# Patient Record
Sex: FEMALE | Race: BLACK OR AFRICAN AMERICAN | NOT HISPANIC OR LATINO | Employment: OTHER | URBAN - METROPOLITAN AREA
[De-identification: names, ages, dates, MRNs, and addresses within clinical notes are randomized per-mention and may not be internally consistent; named-entity substitution may affect disease eponyms.]

---

## 2021-10-05 ENCOUNTER — TELEPHONE (OUTPATIENT)
Dept: OBSTETRICS AND GYNECOLOGY | Facility: CLINIC | Age: 36
End: 2021-10-05

## 2021-10-14 ENCOUNTER — OFFICE VISIT (OUTPATIENT)
Dept: OBSTETRICS AND GYNECOLOGY | Facility: CLINIC | Age: 36
End: 2021-10-14
Payer: MEDICAID

## 2021-10-14 VITALS
WEIGHT: 181 LBS | HEIGHT: 60 IN | DIASTOLIC BLOOD PRESSURE: 72 MMHG | BODY MASS INDEX: 35.53 KG/M2 | SYSTOLIC BLOOD PRESSURE: 108 MMHG

## 2021-10-14 DIAGNOSIS — N63.21 BREAST LUMP ON LEFT SIDE AT 2 O'CLOCK POSITION: ICD-10-CM

## 2021-10-14 DIAGNOSIS — Z00.00 WELLNESS EXAMINATION: Primary | ICD-10-CM

## 2021-10-14 PROCEDURE — 88175 CYTOPATH C/V AUTO FLUID REDO: CPT | Performed by: NURSE PRACTITIONER

## 2021-10-14 PROCEDURE — 99395 PR PREVENTIVE VISIT,EST,18-39: ICD-10-PCS | Mod: S$GLB,,, | Performed by: NURSE PRACTITIONER

## 2021-10-14 PROCEDURE — 99395 PREV VISIT EST AGE 18-39: CPT | Mod: S$GLB,,, | Performed by: NURSE PRACTITIONER

## 2021-10-14 RX ORDER — OXYCODONE AND ACETAMINOPHEN 7.5; 325 MG/1; MG/1
1 TABLET ORAL EVERY 8 HOURS PRN
COMMUNITY
Start: 2021-09-30

## 2021-10-20 LAB
FINAL PATHOLOGIC DIAGNOSIS: NORMAL
Lab: NORMAL

## 2021-11-05 ENCOUNTER — HOSPITAL ENCOUNTER (OUTPATIENT)
Dept: RADIOLOGY | Facility: HOSPITAL | Age: 36
Discharge: HOME | End: 2021-11-05
Attending: NURSE PRACTITIONER
Payer: MEDICAID

## 2021-11-05 VITALS — HEIGHT: 60 IN | WEIGHT: 181 LBS | BODY MASS INDEX: 35.53 KG/M2

## 2021-11-05 DIAGNOSIS — N63.21 BREAST LUMP ON LEFT SIDE AT 2 O'CLOCK POSITION: ICD-10-CM

## 2021-11-05 PROCEDURE — 77066 DX MAMMO INCL CAD BI: CPT | Mod: 26,,, | Performed by: RADIOLOGY

## 2021-11-05 PROCEDURE — 77066 MAMMO DIGITAL DIAGNOSTIC BILAT WITH TOMO: ICD-10-PCS | Mod: 26,,, | Performed by: RADIOLOGY

## 2021-11-05 PROCEDURE — 76642 ULTRASOUND BREAST LIMITED: CPT | Mod: TC,LT

## 2021-11-05 PROCEDURE — 77062 BREAST TOMOSYNTHESIS BI: CPT | Mod: 26,,, | Performed by: RADIOLOGY

## 2021-11-05 PROCEDURE — 77062 MAMMO DIGITAL DIAGNOSTIC BILAT WITH TOMO: ICD-10-PCS | Mod: 26,,, | Performed by: RADIOLOGY

## 2021-11-05 PROCEDURE — 77062 BREAST TOMOSYNTHESIS BI: CPT | Mod: TC

## 2021-11-05 PROCEDURE — 76642 ULTRASOUND BREAST LIMITED: CPT | Mod: 26,LT,, | Performed by: RADIOLOGY

## 2021-11-05 PROCEDURE — 76642 US BREAST LEFT LIMITED: ICD-10-PCS | Mod: 26,LT,, | Performed by: RADIOLOGY

## 2021-11-11 ENCOUNTER — OFFICE VISIT (OUTPATIENT)
Dept: OBSTETRICS AND GYNECOLOGY | Facility: CLINIC | Age: 36
End: 2021-11-11
Payer: MEDICAID

## 2021-11-11 ENCOUNTER — TELEPHONE (OUTPATIENT)
Dept: OBSTETRICS AND GYNECOLOGY | Facility: CLINIC | Age: 36
End: 2021-11-11

## 2021-11-11 VITALS
BODY MASS INDEX: 35.14 KG/M2 | WEIGHT: 179 LBS | SYSTOLIC BLOOD PRESSURE: 128 MMHG | DIASTOLIC BLOOD PRESSURE: 70 MMHG | HEIGHT: 60 IN

## 2021-11-11 DIAGNOSIS — Z71.3 WEIGHT LOSS COUNSELING, ENCOUNTER FOR: Primary | ICD-10-CM

## 2021-11-11 PROCEDURE — 99213 PR OFFICE/OUTPT VISIT, EST, LEVL III, 20-29 MIN: ICD-10-PCS | Mod: S$GLB,,, | Performed by: NURSE PRACTITIONER

## 2021-11-11 PROCEDURE — 99213 OFFICE O/P EST LOW 20 MIN: CPT | Mod: S$GLB,,, | Performed by: NURSE PRACTITIONER

## 2021-12-02 ENCOUNTER — HOSPITAL ENCOUNTER (OUTPATIENT)
Dept: RADIOLOGY | Facility: HOSPITAL | Age: 36
Discharge: HOME | End: 2021-12-02
Attending: NURSE PRACTITIONER
Payer: MEDICAID

## 2021-12-02 DIAGNOSIS — Z98.890 STATUS POST BREAST BIOPSY: ICD-10-CM

## 2021-12-02 DIAGNOSIS — N63.20 BREAST MASS, LEFT: ICD-10-CM

## 2021-12-02 DIAGNOSIS — R92.8 ABNORMAL ULTRASOUND OF BREAST: ICD-10-CM

## 2021-12-02 PROCEDURE — 88305 TISSUE EXAM BY PATHOLOGIST: CPT | Mod: 26,,, | Performed by: PATHOLOGY

## 2021-12-02 PROCEDURE — A4550 SURGICAL TRAYS: HCPCS

## 2021-12-02 PROCEDURE — 19083 US BREAST BIOPSY WITH IMAGING 1ST SITE LEFT: ICD-10-PCS | Mod: LT,,, | Performed by: RADIOLOGY

## 2021-12-02 PROCEDURE — 88305 TISSUE EXAM BY PATHOLOGIST: CPT | Performed by: PATHOLOGY

## 2021-12-02 PROCEDURE — 25000003 PHARM REV CODE 250: Performed by: RADIOLOGY

## 2021-12-02 PROCEDURE — 88305 TISSUE EXAM BY PATHOLOGIST: ICD-10-PCS | Mod: 26,,, | Performed by: PATHOLOGY

## 2021-12-02 PROCEDURE — 19083 BX BREAST 1ST LESION US IMAG: CPT | Mod: LT

## 2021-12-02 PROCEDURE — 19083 BX BREAST 1ST LESION US IMAG: CPT | Mod: LT,,, | Performed by: RADIOLOGY

## 2021-12-02 RX ORDER — LIDOCAINE HYDROCHLORIDE AND EPINEPHRINE 10; 10 MG/ML; UG/ML
10 INJECTION, SOLUTION INFILTRATION; PERINEURAL ONCE
Status: COMPLETED | OUTPATIENT
Start: 2021-12-02 | End: 2021-12-02

## 2021-12-02 RX ORDER — SODIUM BICARBONATE 42 MG/ML
1 INJECTION, SOLUTION INTRAVENOUS ONCE
Status: COMPLETED | OUTPATIENT
Start: 2021-12-02 | End: 2021-12-02

## 2021-12-02 RX ADMIN — SODIUM BICARBONATE 2 ML: 42 INJECTION, SOLUTION INTRAVENOUS at 11:12

## 2021-12-02 RX ADMIN — LIDOCAINE HYDROCHLORIDE AND EPINEPHRINE 10 ML: 10; 10 INJECTION, SOLUTION INFILTRATION; PERINEURAL at 11:12

## 2021-12-03 LAB
FINAL PATHOLOGIC DIAGNOSIS: NORMAL
GROSS: NORMAL
Lab: NORMAL

## 2021-12-27 ENCOUNTER — OFFICE VISIT (OUTPATIENT)
Dept: OBSTETRICS AND GYNECOLOGY | Facility: CLINIC | Age: 36
End: 2021-12-27
Payer: MEDICAID

## 2021-12-27 VITALS
DIASTOLIC BLOOD PRESSURE: 80 MMHG | WEIGHT: 174.81 LBS | BODY MASS INDEX: 33 KG/M2 | HEIGHT: 61 IN | SYSTOLIC BLOOD PRESSURE: 100 MMHG

## 2021-12-27 DIAGNOSIS — Z71.3 WEIGHT LOSS COUNSELING, ENCOUNTER FOR: Primary | ICD-10-CM

## 2021-12-27 PROCEDURE — 3079F DIAST BP 80-89 MM HG: CPT | Mod: CPTII,S$GLB,, | Performed by: NURSE PRACTITIONER

## 2021-12-27 PROCEDURE — 3008F PR BODY MASS INDEX (BMI) DOCUMENTED: ICD-10-PCS | Mod: CPTII,S$GLB,, | Performed by: NURSE PRACTITIONER

## 2021-12-27 PROCEDURE — 1160F PR REVIEW ALL MEDS BY PRESCRIBER/CLIN PHARMACIST DOCUMENTED: ICD-10-PCS | Mod: CPTII,S$GLB,, | Performed by: NURSE PRACTITIONER

## 2021-12-27 PROCEDURE — 3074F PR MOST RECENT SYSTOLIC BLOOD PRESSURE < 130 MM HG: ICD-10-PCS | Mod: CPTII,S$GLB,, | Performed by: NURSE PRACTITIONER

## 2021-12-27 PROCEDURE — 3074F SYST BP LT 130 MM HG: CPT | Mod: CPTII,S$GLB,, | Performed by: NURSE PRACTITIONER

## 2021-12-27 PROCEDURE — 99213 PR OFFICE/OUTPT VISIT, EST, LEVL III, 20-29 MIN: ICD-10-PCS | Mod: S$GLB,,, | Performed by: NURSE PRACTITIONER

## 2021-12-27 PROCEDURE — 1159F PR MEDICATION LIST DOCUMENTED IN MEDICAL RECORD: ICD-10-PCS | Mod: CPTII,S$GLB,, | Performed by: NURSE PRACTITIONER

## 2021-12-27 PROCEDURE — 1159F MED LIST DOCD IN RCRD: CPT | Mod: CPTII,S$GLB,, | Performed by: NURSE PRACTITIONER

## 2021-12-27 PROCEDURE — 3079F PR MOST RECENT DIASTOLIC BLOOD PRESSURE 80-89 MM HG: ICD-10-PCS | Mod: CPTII,S$GLB,, | Performed by: NURSE PRACTITIONER

## 2021-12-27 PROCEDURE — 99213 OFFICE O/P EST LOW 20 MIN: CPT | Mod: S$GLB,,, | Performed by: NURSE PRACTITIONER

## 2021-12-27 PROCEDURE — 3008F BODY MASS INDEX DOCD: CPT | Mod: CPTII,S$GLB,, | Performed by: NURSE PRACTITIONER

## 2021-12-27 PROCEDURE — 1160F RVW MEDS BY RX/DR IN RCRD: CPT | Mod: CPTII,S$GLB,, | Performed by: NURSE PRACTITIONER

## 2021-12-27 RX ORDER — PHENTERMINE HYDROCHLORIDE 37.5 MG/1
37.5 TABLET ORAL EVERY MORNING
COMMUNITY
Start: 2021-11-11 | End: 2021-12-27 | Stop reason: SDUPTHER

## 2021-12-27 RX ORDER — CYCLOBENZAPRINE HCL 10 MG
TABLET ORAL
COMMUNITY
Start: 2021-11-22

## 2021-12-27 RX ORDER — PHENTERMINE HYDROCHLORIDE 37.5 MG/1
37.5 TABLET ORAL EVERY MORNING
Qty: 30 TABLET | Refills: 0 | Status: SHIPPED | OUTPATIENT
Start: 2021-12-27

## 2022-08-10 ENCOUNTER — TELEPHONE (OUTPATIENT)
Dept: OBSTETRICS AND GYNECOLOGY | Facility: CLINIC | Age: 37
End: 2022-08-10
Payer: MEDICAID

## 2022-08-10 DIAGNOSIS — N63.21 BREAST LUMP ON LEFT SIDE AT 2 O'CLOCK POSITION: Primary | ICD-10-CM

## 2022-08-10 NOTE — TELEPHONE ENCOUNTER
----- Message from Marbella Kelley MA sent at 8/9/2022  3:01 PM CDT -----  Regarding: MAMMO order  Patient Stated she wanted to be back with you and she requested to have a MAMMO ordered she supposed to have these every 6 months

## 2022-08-29 ENCOUNTER — HOSPITAL ENCOUNTER (OUTPATIENT)
Dept: RADIOLOGY | Facility: HOSPITAL | Age: 37
Discharge: HOME | End: 2022-08-29
Attending: NURSE PRACTITIONER
Payer: MEDICAID

## 2022-08-29 DIAGNOSIS — N63.21 BREAST LUMP ON LEFT SIDE AT 2 O'CLOCK POSITION: ICD-10-CM

## 2022-08-29 DIAGNOSIS — Z09 FOLLOW-UP EXAM, 3-6 MONTHS SINCE PREVIOUS EXAM: ICD-10-CM

## 2022-08-29 DIAGNOSIS — R59.0 ENLARGED LYMPH NODES IN ARMPIT: ICD-10-CM

## 2022-08-29 DIAGNOSIS — Z98.890 STATUS POST BREAST BIOPSY: ICD-10-CM

## 2022-08-29 PROCEDURE — 77065 MAMMO DIGITAL DIAGNOSTIC LEFT WITH TOMO: ICD-10-PCS | Mod: 26,LT,, | Performed by: RADIOLOGY

## 2022-08-29 PROCEDURE — 77061 MAMMO DIGITAL DIAGNOSTIC LEFT WITH TOMO: ICD-10-PCS | Mod: 26,LT,, | Performed by: RADIOLOGY

## 2022-08-29 PROCEDURE — 77065 DX MAMMO INCL CAD UNI: CPT | Mod: TC,LT

## 2022-08-29 PROCEDURE — 77061 BREAST TOMOSYNTHESIS UNI: CPT | Mod: 26,LT,, | Performed by: RADIOLOGY

## 2022-08-29 PROCEDURE — 77065 DX MAMMO INCL CAD UNI: CPT | Mod: 26,LT,, | Performed by: RADIOLOGY

## 2024-10-04 ENCOUNTER — PATIENT MESSAGE (OUTPATIENT)
Dept: OBSTETRICS AND GYNECOLOGY | Facility: CLINIC | Age: 39
End: 2024-10-04
Payer: MEDICAID

## 2024-10-07 ENCOUNTER — OFFICE VISIT (OUTPATIENT)
Dept: OBSTETRICS AND GYNECOLOGY | Facility: CLINIC | Age: 39
End: 2024-10-07
Payer: MEDICAID

## 2024-10-07 VITALS
DIASTOLIC BLOOD PRESSURE: 70 MMHG | WEIGHT: 172.19 LBS | SYSTOLIC BLOOD PRESSURE: 118 MMHG | RESPIRATION RATE: 18 BRPM | HEART RATE: 65 BPM | BODY MASS INDEX: 32.53 KG/M2

## 2024-10-07 DIAGNOSIS — Z32.00 ENCOUNTER FOR CONFIRMATION OF PREGNANCY TEST RESULT WITH PHYSICAL EXAMINATION: Primary | ICD-10-CM

## 2024-10-07 LAB
B-HCG UR QL: POSITIVE
CREAT UR-MCNC: 297 MG/DL (ref 15–325)
CTP QC/QA: YES
PROT UR-MCNC: 15 MG/DL (ref 0–15)
PROT/CREAT UR: 0.05 MG/G{CREAT} (ref 0–0.2)

## 2024-10-07 PROCEDURE — 1159F MED LIST DOCD IN RCRD: CPT | Mod: CPTII,,, | Performed by: FAMILY MEDICINE

## 2024-10-07 PROCEDURE — 87086 URINE CULTURE/COLONY COUNT: CPT | Performed by: FAMILY MEDICINE

## 2024-10-07 PROCEDURE — 3074F SYST BP LT 130 MM HG: CPT | Mod: CPTII,,, | Performed by: FAMILY MEDICINE

## 2024-10-07 PROCEDURE — 99999PBSHW POCT URINE PREGNANCY: Mod: PBBFAC,,,

## 2024-10-07 PROCEDURE — 3008F BODY MASS INDEX DOCD: CPT | Mod: CPTII,,, | Performed by: FAMILY MEDICINE

## 2024-10-07 PROCEDURE — 82570 ASSAY OF URINE CREATININE: CPT | Performed by: FAMILY MEDICINE

## 2024-10-07 PROCEDURE — 99999 PR PBB SHADOW E&M-EST. PATIENT-LVL III: CPT | Mod: PBBFAC,,, | Performed by: FAMILY MEDICINE

## 2024-10-07 PROCEDURE — 99213 OFFICE O/P EST LOW 20 MIN: CPT | Mod: S$PBB,,, | Performed by: FAMILY MEDICINE

## 2024-10-07 PROCEDURE — 81025 URINE PREGNANCY TEST: CPT | Mod: PBBFAC,PO | Performed by: FAMILY MEDICINE

## 2024-10-07 PROCEDURE — 99213 OFFICE O/P EST LOW 20 MIN: CPT | Mod: PBBFAC,PO | Performed by: FAMILY MEDICINE

## 2024-10-07 PROCEDURE — 1160F RVW MEDS BY RX/DR IN RCRD: CPT | Mod: CPTII,,, | Performed by: FAMILY MEDICINE

## 2024-10-07 PROCEDURE — 3078F DIAST BP <80 MM HG: CPT | Mod: CPTII,,, | Performed by: FAMILY MEDICINE

## 2024-10-07 NOTE — PROGRESS NOTES
Faye Mustafa  39 y.o.   MRN  [id number] PATIENT   1985   FINAL EDC:   ___   by ___    Baby: ___    SO Name: Brian ALLERGIES:    NKDA      GBS: ___  Date: ___ OB PROBLEM LIST:    AMA, 40yo @ delivery    [  ] consider ASA 81mg    [  ] Targeted US    [  ] BPP weekly @ 32wks if >39    [  ] Growth US @ 32wks    [  ] deliver @ 39wks if >39 or IVF      TO-DO THROUGHOUT PREGNANCY:  Depression Screen: ___  Circumcision: ___  Rhogam: ___  Flu Shot: ___  TDAP: ___  Infant feeding: ___   Plans for epidural: ___  Classes at hospital: ___  PP contraception: ___  Delivery Consent: ___  TOLAC counseling: ___  BTL counseling: ___  Pediatrician: ___ MEDICATIONS:    PNV   TODAY'S PROGRESS NOTE    10/07/2024    OB INTAKE APPOINTMENT  Faye Mustafa is a 39 y.o. who presents today for her OB Intake Appointment.    She denies any problems so far.       PREGNANCY DATING:    Pregnancy test today = positive  LMP 24 ---gives EDC---> 25 ----> 6+5 wks EGA today    Sure LMP: Yes  Prior US done: No  Other information relevant to dating (sure conception date, IVF date, etc.): No     CARRIER SCREENING PREVIOUSLY DONE:  Cystic Fibrosis, Spinal Muscular Atrophy, Fragile X:  Per patient, a prior screen was negative.  Hemoglobinopathies: Prior testing reviewed.  The patient has no known hemoglobinopathies.     FAMILY LINEAGE AND HISTORY:  Ashkenazi or North American Pentecostalism:  No  Intellectual disability:  No  Premature ovarian failure (<40yoa):  No  Cajun or Pakistani Dyer:  No    MISCELLANEOUS:  Does the patient have cats? No    MEDICATIONS:  Current Outpatient Medications   Medication Instructions    DUPIXENT  mg/2 mL PnIj SMARTSI Milligram(s) SUB-Q Every 2 Weeks    prenatal vit,calc76/iron/folic (PNV 29-1 ORAL) 1 tablet, Oral, Daily       --------------------------------------------------------------------------------     ASSESMENT & PLAN:  Pregnancy confirmed today with a positive  "pregnancy test.  Patient's medical history was obtained today.    A first trimester dating ultrasound was ordered and scheduled (ideally done between 8 and 10wks).    Gave patient our OB Welcome Packet and reviewed its contents.  Our discussion included:  an overview of appointments, hydration / nutrition / weight gain advice, safe OTC medications, what substances and exposures to avoid, vaccine recommendations, advice for morning sickness, dental hygiene advice, recommendations regarding exercise, advice for travel in pregnancy, information about breastfeeding, postpartum birth control, and circumcision, pediatricians in the community, WIC enrollment, how to contact us for concerns or problems during pregnancy, warning or danger signs.  Also provided [facility]'s publication, "Your Pregnancy from A-Z."    Advised patient to enroll in Everypost if not already done.    Medication management:.  Advised patient to start a prenatal vitamin if not already taking.  It should contain 600mcg folic acid, 27mg iron, and 200mg DHA.     Genetic and Carrier Screening options were discussed in detail with the patient.  Once her EDC is confirmed, she may go to [facility] for the test(s) if desired @ 9wks or greater.  She was encouraged to review the detailed information available in the OB Welcome Packet.    The patient was directed to the lab for  Routine OB labs      PAP smear: up to date    SAB precautions reviewed    Timing of next clinic appointment will be determined by dating ultrasound.  Will send patient a message in Everypost.    Dalia Mariee NP     LABS   INITIAL LABS:    Use LNOB (for Epic auto-fill)  Use LLWOBLABS (for manual entry) OTHER LABS:    Use L28W (for Epic auto-fill)  Use LLWOBLABS (for manual entry)   ULTRASOUNDS   ANATOMY SCAN:    Use LLWOANATOMYSCAN      HISTORY   MEDICAL HISTORY:    Pre-pregnancy BMI = 33  anemia SURGICAL HISTORY:    Breast biopsy (Left)  Carpal Tunnel Release  Breast reduction   "     OBSTETRIC HISTORY   Month/Year Mode of Delivery EGA Wt. M/F Epidural Complications / Comments   2018  Term 6#5 Female none    2016  Term 6#5 Male none    2010  Term 6#13 Female Epidural    2005  Term 6#5 Male Epidural               SOCIAL HISTORY:    Smoker: non-smoker  Alcohol: denies  Drugs: denies  Relationship:   Domestic Violence: no  Lives with:   Education Level: Undergraduate Degree  Occupation: homemaker  Christianity: n/a GYN HISTORY:    PAP'S: no h/o abnormals  LAST PAP: PAP neg / Date: 10/20/2021  STD Hx: no past history  GENITAL HSV: no FAMILY HISTORY:    HTN: yes (mother and father)  DIABETES: yes (mother)  BLEEDING D/O: no  CLOTTING D/O: no  BIRTH DEFECTS: no  MENTAL DISABILITY: no  GYN CANCER: yes  Breast CA: M aunt, P aunt     Follow up for dating ultrasound around 9 wk EGA, 1st OB around 10-12 wk EGA.   Future Appointments   Date Time Provider Department Center   10/11/2024  4:00 PM LAB, [facility] [place]   10/23/2024  4:00 PM ULTRASOUND, [facility] [place] [facility]   10/30/2024  2:30 PM Manjula Wall MD [facility] [place] [facility]

## 2024-10-08 LAB — BACTERIA UR CULT: NO GROWTH

## 2024-10-11 ENCOUNTER — LAB VISIT (OUTPATIENT)
Dept: LAB | Facility: HOSPITAL | Age: 39
End: 2024-10-11
Attending: FAMILY MEDICINE
Payer: MEDICAID

## 2024-10-11 DIAGNOSIS — Z32.00 ENCOUNTER FOR CONFIRMATION OF PREGNANCY TEST RESULT WITH PHYSICAL EXAMINATION: ICD-10-CM

## 2024-10-11 LAB
ALBUMIN SERPL BCP-MCNC: 3.5 G/DL (ref 3.5–5.2)
ALP SERPL-CCNC: 53 U/L (ref 55–135)
ALT SERPL W/O P-5'-P-CCNC: 10 U/L (ref 10–44)
ANION GAP SERPL CALC-SCNC: 6 MMOL/L (ref 8–16)
AST SERPL-CCNC: 14 U/L (ref 10–40)
BASOPHILS # BLD AUTO: 0.04 K/UL (ref 0–0.2)
BASOPHILS NFR BLD: 0.6 % (ref 0–1.9)
BILIRUB SERPL-MCNC: 0.3 MG/DL (ref 0.1–1)
BUN SERPL-MCNC: 7 MG/DL (ref 6–20)
CALCIUM SERPL-MCNC: 9.3 MG/DL (ref 8.7–10.5)
CHLORIDE SERPL-SCNC: 105 MMOL/L (ref 95–110)
CO2 SERPL-SCNC: 24 MMOL/L (ref 23–29)
CREAT SERPL-MCNC: 0.7 MG/DL (ref 0.5–1.4)
DIFFERENTIAL METHOD BLD: ABNORMAL
EOSINOPHIL # BLD AUTO: 0 K/UL (ref 0–0.5)
EOSINOPHIL NFR BLD: 0.1 % (ref 0–8)
ERYTHROCYTE [DISTWIDTH] IN BLOOD BY AUTOMATED COUNT: 16.3 % (ref 11.5–14.5)
EST. GFR  (NO RACE VARIABLE): >60 ML/MIN/1.73 M^2
GLUCOSE SERPL-MCNC: 81 MG/DL (ref 70–110)
HCT VFR BLD AUTO: 32.6 % (ref 37–48.5)
HGB BLD-MCNC: 10.3 G/DL (ref 12–16)
IMM GRANULOCYTES # BLD AUTO: 0.01 K/UL (ref 0–0.04)
IMM GRANULOCYTES NFR BLD AUTO: 0.1 % (ref 0–0.5)
LYMPHOCYTES # BLD AUTO: 1.9 K/UL (ref 1–4.8)
LYMPHOCYTES NFR BLD: 26.6 % (ref 18–48)
MCH RBC QN AUTO: 24.2 PG (ref 27–31)
MCHC RBC AUTO-ENTMCNC: 31.6 G/DL (ref 32–36)
MCV RBC AUTO: 77 FL (ref 82–98)
MONOCYTES # BLD AUTO: 0.6 K/UL (ref 0.3–1)
MONOCYTES NFR BLD: 8.9 % (ref 4–15)
NEUTROPHILS # BLD AUTO: 4.6 K/UL (ref 1.8–7.7)
NEUTROPHILS NFR BLD: 63.7 % (ref 38–73)
NRBC BLD-RTO: 0 /100 WBC
PLATELET # BLD AUTO: 281 K/UL (ref 150–450)
PMV BLD AUTO: 11 FL (ref 9.2–12.9)
POTASSIUM SERPL-SCNC: 3.6 MMOL/L (ref 3.5–5.1)
PROT SERPL-MCNC: 7.2 G/DL (ref 6–8.4)
RBC # BLD AUTO: 4.25 M/UL (ref 4–5.4)
SODIUM SERPL-SCNC: 135 MMOL/L (ref 136–145)
TREPONEMA PALLIDUM IGG+IGM AB [PRESENCE] IN SERUM OR PLASMA BY IMMUNOASSAY: NONREACTIVE
WBC # BLD AUTO: 7.22 K/UL (ref 3.9–12.7)

## 2024-10-11 PROCEDURE — 83021 HEMOGLOBIN CHROMOTOGRAPHY: CPT | Performed by: FAMILY MEDICINE

## 2024-10-11 PROCEDURE — 80053 COMPREHEN METABOLIC PANEL: CPT | Performed by: FAMILY MEDICINE

## 2024-10-11 PROCEDURE — 86900 BLOOD TYPING SEROLOGIC ABO: CPT | Performed by: FAMILY MEDICINE

## 2024-10-11 PROCEDURE — 86593 SYPHILIS TEST NON-TREP QUANT: CPT | Performed by: FAMILY MEDICINE

## 2024-10-11 PROCEDURE — 86803 HEPATITIS C AB TEST: CPT | Performed by: FAMILY MEDICINE

## 2024-10-11 PROCEDURE — 87340 HEPATITIS B SURFACE AG IA: CPT | Performed by: FAMILY MEDICINE

## 2024-10-11 PROCEDURE — 86850 RBC ANTIBODY SCREEN: CPT | Performed by: FAMILY MEDICINE

## 2024-10-11 PROCEDURE — 87389 HIV-1 AG W/HIV-1&-2 AB AG IA: CPT | Performed by: FAMILY MEDICINE

## 2024-10-11 PROCEDURE — 86901 BLOOD TYPING SEROLOGIC RH(D): CPT | Performed by: FAMILY MEDICINE

## 2024-10-11 PROCEDURE — 85025 COMPLETE CBC W/AUTO DIFF WBC: CPT | Performed by: FAMILY MEDICINE

## 2024-10-11 PROCEDURE — 86787 VARICELLA-ZOSTER ANTIBODY: CPT | Performed by: FAMILY MEDICINE

## 2024-10-11 PROCEDURE — 86762 RUBELLA ANTIBODY: CPT | Performed by: FAMILY MEDICINE

## 2024-10-11 PROCEDURE — 83020 HEMOGLOBIN ELECTROPHORESIS: CPT | Performed by: FAMILY MEDICINE

## 2024-10-11 PROCEDURE — 83036 HEMOGLOBIN GLYCOSYLATED A1C: CPT | Performed by: FAMILY MEDICINE

## 2024-10-12 ENCOUNTER — PATIENT MESSAGE (OUTPATIENT)
Dept: OBSTETRICS AND GYNECOLOGY | Facility: CLINIC | Age: 39
End: 2024-10-12
Payer: MEDICAID

## 2024-10-12 DIAGNOSIS — J40 BRONCHITIS: ICD-10-CM

## 2024-10-12 DIAGNOSIS — J06.9 UPPER RESPIRATORY TRACT INFECTION, UNSPECIFIED TYPE: Primary | ICD-10-CM

## 2024-10-12 LAB
ABO + RH BLD: NORMAL
BLD GP AB SCN CELLS X3 SERPL QL: NORMAL
ESTIMATED AVG GLUCOSE: 105 MG/DL (ref 68–131)
HBA1C MFR BLD: 5.3 % (ref 4–5.6)
HBV SURFACE AG SERPL QL IA: NORMAL
HCV AB SERPL QL IA: NORMAL
HIV 1+2 AB+HIV1 P24 AG SERPL QL IA: NORMAL
VARICELLA INTERPRETATION: POSITIVE
VARICELLA ZOSTER IGG: 2120

## 2024-10-14 LAB
RUBV IGG SER-ACNC: 42 IU/ML
RUBV IGG SER-IMP: REACTIVE

## 2024-10-14 RX ORDER — AMOXICILLIN AND CLAVULANATE POTASSIUM 875; 125 MG/1; MG/1
1 TABLET, FILM COATED ORAL EVERY 12 HOURS
Qty: 14 TABLET | Refills: 0 | Status: SHIPPED | OUTPATIENT
Start: 2024-10-14 | End: 2024-10-21

## 2024-10-14 NOTE — TELEPHONE ENCOUNTER
Pt was seen 10/7 for pregnancy confirmation. Please advise for medication or if pt needs to schedule an appt.

## 2024-10-15 LAB
HGB A2 MFR BLD HPLC: 2.2 % (ref 2.2–3.2)
HGB FRACT BLD ELPH-IMP: NORMAL
HGB FRACT BLD ELPH-IMP: NORMAL

## 2024-10-22 ENCOUNTER — NURSE TRIAGE (OUTPATIENT)
Dept: ADMINISTRATIVE | Facility: CLINIC | Age: 39
End: 2024-10-22
Payer: MEDICAID

## 2024-10-23 ENCOUNTER — HOSPITAL ENCOUNTER (OUTPATIENT)
Dept: OBSTETRICS AND GYNECOLOGY | Facility: CLINIC | Age: 39
Discharge: HOME | End: 2024-10-23
Payer: MEDICAID

## 2024-10-23 ENCOUNTER — PATIENT MESSAGE (OUTPATIENT)
Dept: OBSTETRICS AND GYNECOLOGY | Facility: CLINIC | Age: 39
End: 2024-10-23
Payer: MEDICAID

## 2024-10-23 DIAGNOSIS — Z32.00 ENCOUNTER FOR CONFIRMATION OF PREGNANCY TEST RESULT WITH PHYSICAL EXAMINATION: ICD-10-CM

## 2024-10-23 PROCEDURE — 76801 OB US < 14 WKS SINGLE FETUS: CPT | Mod: 26,,, | Performed by: OBSTETRICS & GYNECOLOGY

## 2024-11-01 ENCOUNTER — NURSE TRIAGE (OUTPATIENT)
Dept: ADMINISTRATIVE | Facility: CLINIC | Age: 39
End: 2024-11-01
Payer: MEDICAID

## 2024-11-04 ENCOUNTER — TELEPHONE (OUTPATIENT)
Dept: OBSTETRICS AND GYNECOLOGY | Facility: CLINIC | Age: 39
End: 2024-11-04
Payer: MEDICAID

## 2024-11-04 NOTE — TELEPHONE ENCOUNTER
Pt states she has tried everything in the OB books except the Vitamin B and non-drowsy dramamine.  Advised pt to try these and if they don't work, please let us know. Pt voiced understanding.

## 2024-11-04 NOTE — TELEPHONE ENCOUNTER
----- Message from Filiberto sent at 11/4/2024  8:56 AM CST -----  Contact: Self  Type: Needs Medical Advice  Who Called:  Patient  Symptoms (please be specific):  nausea  How long has patient had these symptoms:  1w  Pharmacy name and phone #:      [facility][id number] - [facility] [place] - [address]  [address]  [place] [address]  Phone: [phone] Fax: [phone]    Best Call Back Number: [phone]   Additional Information:  Called to have something called in for symtom

## 2024-11-06 ENCOUNTER — APPOINTMENT (OUTPATIENT)
Dept: LAB | Facility: HOSPITAL | Age: 39
End: 2024-11-06
Attending: STUDENT IN AN ORGANIZED HEALTH CARE EDUCATION/TRAINING PROGRAM
Payer: MEDICAID

## 2024-11-06 ENCOUNTER — PATIENT MESSAGE (OUTPATIENT)
Dept: OBSTETRICS AND GYNECOLOGY | Facility: CLINIC | Age: 39
End: 2024-11-06

## 2024-11-06 ENCOUNTER — INITIAL PRENATAL (OUTPATIENT)
Dept: OBSTETRICS AND GYNECOLOGY | Facility: CLINIC | Age: 39
End: 2024-11-06
Payer: MEDICAID

## 2024-11-06 VITALS
DIASTOLIC BLOOD PRESSURE: 70 MMHG | WEIGHT: 171.31 LBS | HEART RATE: 58 BPM | BODY MASS INDEX: 32.37 KG/M2 | SYSTOLIC BLOOD PRESSURE: 108 MMHG

## 2024-11-06 DIAGNOSIS — O99.011 ANEMIA DURING PREGNANCY IN FIRST TRIMESTER: ICD-10-CM

## 2024-11-06 DIAGNOSIS — Z11.3 SCREENING EXAMINATION FOR VENEREAL DISEASE: ICD-10-CM

## 2024-11-06 DIAGNOSIS — O09.521 MULTIGRAVIDA OF ADVANCED MATERNAL AGE IN FIRST TRIMESTER: Primary | ICD-10-CM

## 2024-11-06 DIAGNOSIS — O21.9 NAUSEA AND VOMITING DURING PREGNANCY PRIOR TO 22 WEEKS GESTATION: ICD-10-CM

## 2024-11-06 DIAGNOSIS — Z12.4 SCREENING FOR MALIGNANT NEOPLASM OF THE CERVIX: ICD-10-CM

## 2024-11-06 PROCEDURE — 99999 PR PBB SHADOW E&M-EST. PATIENT-LVL III: CPT | Mod: PBBFAC,,, | Performed by: STUDENT IN AN ORGANIZED HEALTH CARE EDUCATION/TRAINING PROGRAM

## 2024-11-06 PROCEDURE — 99213 OFFICE O/P EST LOW 20 MIN: CPT | Mod: PBBFAC,TH,PO | Performed by: STUDENT IN AN ORGANIZED HEALTH CARE EDUCATION/TRAINING PROGRAM

## 2024-11-06 PROCEDURE — 99203 OFFICE O/P NEW LOW 30 MIN: CPT | Mod: S$PBB,TH,, | Performed by: STUDENT IN AN ORGANIZED HEALTH CARE EDUCATION/TRAINING PROGRAM

## 2024-11-06 RX ORDER — ONDANSETRON 4 MG/1
4 TABLET, FILM COATED ORAL EVERY 8 HOURS PRN
Qty: 30 TABLET | Refills: 2 | Status: SHIPPED | OUTPATIENT
Start: 2024-11-06

## 2024-11-06 NOTE — PROGRESS NOTES
Faye Mustafa  39 y.o.   MRN  [id number] PATIENT   1985   FINAL EDC:   5.29.25   by 8 wk US    Baby: ___    SO Name: Brian ALLERGIES:    NKDA      GBS: ___  Date: ___ OB PROBLEM LIST:    AMA, 38yo @ delivery    [x ] consider ASA 81mg    [  ] Targeted US    [  ] BPP weekly @ 32wks if >39    [  ] Growth US @ 32wks    [  ] deliver @ 39wks if >39 or IVF    Obesity, BMI 33    [x ] baseline spot PCR and HbA1C      Anemia      TO-DO THROUGHOUT PREGNANCY:  Depression Screen: ___  Circumcision: ___  Rhogam: ___  Flu Shot: ___  TDAP: ___  Infant feeding: ___   Plans for epidural: ___  Classes at hospital: ___  PP contraception: ___  Delivery Consent: ___  TOLAC counseling: ___  BTL counseling: ___  Pediatrician: ___ MEDICATIONS:    PNV  Zofran  Fe   TODAY'S PROGRESS NOTE    Subjective:      Faye Mustafa is being seen today for her first obstetrical visit.  She is at 10w6d gestation by 8 wk US.  She has no complaints today.  She reports nausea and vomiting unrelieved with dramamine or unisom/B6.  She denies any VB current, but did have a gush of blood blood.    Her obstetrical history is significant for advanced maternal age.       History reviewed. No pertinent past medical history.     Past Surgical History:   Procedure Laterality Date    BREAST BIOPSY Left     benign lymph node    CARPAL TUNNEL RELEASE      REDUCTION OF BOTH BREASTS             Review of patient's allergies indicates:  No Known Allergies    Current Outpatient Medications   Medication Instructions    ondansetron (ZOFRAN) 4 mg, Oral, Every 8 hours PRN    prenatal vit,calc76/iron/folic (PNV 29-1 ORAL) 1 tablet, Daily        OB History    Para Term  AB Living   5 4 4 0 0 4   SAB IAB Ectopic Multiple Live Births   0 0 0 0 4      # Outcome Date GA Lbr Travis/2nd Weight Sex Type Anes PTL Lv   5 Current            4 Term 18   2.863 kg (6 lb 5 oz) F Vag-Spont None  BROOKS   3 Term 16   2.863 kg (6  lb 5 oz) M Vag-Spont None  BROOKS   2 Term 04/28/10   3.09 kg (6 lb 13 oz) F Vag-Spont EPI  BROOKS   1 Term 03/17/05   2.863 kg (6 lb 5 oz) M Vag-Spont EPI  BROOKS       Past GYN history:  Denies any STI or abnl Pap smears    Family History   Problem Relation Name Age of Onset    Heart disease Father      Diabetes Mother      Hypertension Mother      Breast cancer Paternal Aunt      Breast cancer Maternal Aunt        Denies any congenital/birth defects, DS, MR, SCD, CF, bleeding/clotting disorders    Social History     Tobacco Use    Smoking status: Never    Smokeless tobacco: Never   Substance Use Topics    Alcohol use: Never    Drug use: Not Currently        Review of Systems      Review of Systems   Constitutional:  Negative for chills and fever.   Eyes:  Negative for visual disturbance.   Respiratory:  Negative for cough and shortness of breath.    Cardiovascular:  Negative for chest pain and palpitations.   Gastrointestinal:  Negative for abdominal pain, nausea and vomiting.   Genitourinary:  Negative for vaginal bleeding, vaginal discharge, vaginal pain and vaginal odor.   Neurological:  Negative for headaches.   Psychiatric/Behavioral:  Negative for depression and sleep disturbance. The patient is not nervous/anxious.    All other systems reviewed and are negative.  Breast: Negative for lump and mastodynia         Objective:     Vitals:    11/06/24 1017   BP: 108/70   Pulse: (!) 58   Weight: 77.7 kg (171 lb 4.8 oz)        FHT: 161 by [employer]       General Appearance:    Alert, cooperative, no distress, appears stated age   Head:    Normocephalic, without obvious abnormality, atraumatic   Eyes:    conjunctiva/corneas clear       Nose:   Nares normal, septum midline, no drainage or sinus tenderness   Throat:   Lips normal; teeth and gums normal   Neck:   Supple, symmetrical, trachea midline, no adenopathy;     thyroid:  no enlargement/tenderness/nodules;   Back:     Symmetric, no curvature, ROM normal, no CVA tenderness    Lungs:     Clear to auscultation bilaterally, respirations unlabored   Chest Wall:    No tenderness or deformity    Heart:    Regular rate and rhythm, no murmur,   Abdomen:     Soft, non-tender,  no masses, no organomegaly   Extremities:   normal, atraumatic, no cyanosis or edema       Skin:   Skin color, texture, turgor normal, no rashes or lesions   Lymph nodes:   Cervical, supraclavicular, inguinal and axillary nodes normal        Assessment:      Pregnancy at 10 and 6/7 weeks      Problem List Items Addressed This Visit       Multigravida of advanced maternal age in first trimester - Primary    Relevant Orders    [id number] Plus W/ Sca* T21, T18, T13 & Y + X     Other Visit Diagnoses       Screening examination for venereal disease        Screening for malignant neoplasm of the cervix        Nausea and vomiting during pregnancy prior to 22 weeks gestation        Relevant Medications    ondansetron (ZOFRAN) 4 MG tablet             Plan:        Initial labs reviewed.  Discussed anemia.  Encouraged iron suppplementation  Prenatal vitamins.  Zofran offered for N/V.  Patient desires.  1T precautions given  Role of ultrasound in pregnancy discussed; fetal survey: requested.    Aspirin Questionnaire reviewed.  The patient is a candidate for ASA 81mg daily for prevention of pre-eclampsia  based on AMA and Class I obesity.  Advised to start on RV continue till delivery.  Discussed risk reduction for preeclampsia is 24%, for  birth is 14%, and for IUGR is 20%.  There have been no documented harms from low-dose aspirin therapy in pregnancy (regarding maternal or  bleeding complications and childhood problems in children who were exposed to aspirin in utero).  Sleep Apnea screening completed, and the patient is considered low-risk.  Reviewed Carrier and Aneuploidy Screening with the patient.  She requests the [id number] cfDNA test only. and had a prior Carrier Screen in previous pregnancy that was normal per  patient  PAP smear: up to date; CT/NG testing:  up to date  RTC in 4 weeks    I spent a total of 30 minutes on the day of the visit. This includes face to face time and non-face to face time preparing to see the patient (eg, review of tests), obtaining and/or reviewing separately obtained history, documenting clinical information in the electronic or other health record, independently interpreting results and communicating results to the patient/family/caregiver, or care coordinator.                    LABS   INITIAL LABS:    DATE: 10/11/24  MBT: O positive  AB SCREEN: negative  TP-ABS: negative  RUBELLA: Immune  Hep B sAg: negative  Hep C Ab: negative  HIV: negative  H/H: 10.3 / 32.6  PLT: 281  VARICELLA: Immune  HGB: normal  URINE CX: negative    PAP: PAP neg / HPV neg / Date: 24    EDITH/CHLAM/TRICH: negative x 2 / Date: 24   OTHER LABS:    10.11.24  A1c: 5.3  CMP: WNL  PCR: 0.05   ULTRASOUNDS   ANATOMY SCAN:    Use LLWOANATOMYSCAN      HISTORY   MEDICAL HISTORY:    Pre-pregnancy BMI = 33  anemia SURGICAL HISTORY:    Breast biopsy (Left)  Carpal Tunnel Release  Breast reduction       OBSTETRIC HISTORY   Month/Year Mode of Delivery EGA Wt. M/F Epidural Complications / Comments   2018  Term 6#5 Female none IOL   2016  Term 6#5 Male none IOL   2010  Term 6#13 Female Epidural    2005  Term 6#5 Male Epidural               SOCIAL HISTORY:    Smoker: non-smoker  Alcohol: denies  Drugs: denies  Relationship:   Domestic Violence: no  Lives with:   Education Level: Undergraduate Degree  Occupation: homemaker  Anabaptism: n/a GYN HISTORY:    PAP'S: no h/o abnormals  LAST PAP: NILM, negative HRHPV (24)  STD Hx: no past history  GENITAL HSV: no FAMILY HISTORY:    HTN: yes (mother and father)  DIABETES: yes (mother)  BLEEDING D/O: no  CLOTTING D/O: no  BIRTH DEFECTS: no  MENTAL DISABILITY: no  GYN CANCER: yes  Breast CA: M aunt, P aunt     No follow-ups on file.   No future  appointments.

## 2024-11-21 ENCOUNTER — TELEPHONE (OUTPATIENT)
Dept: OBSTETRICS AND GYNECOLOGY | Facility: CLINIC | Age: 39
End: 2024-11-21
Payer: MEDICAID

## 2024-11-21 ENCOUNTER — PATIENT MESSAGE (OUTPATIENT)
Dept: OBSTETRICS AND GYNECOLOGY | Facility: CLINIC | Age: 39
End: 2024-11-21
Payer: MEDICAID

## 2024-11-21 RX ORDER — FLUCONAZOLE 150 MG/1
150 TABLET ORAL DAILY
Qty: 1 TABLET | Refills: 0 | Status: SHIPPED | OUTPATIENT
Start: 2024-11-21 | End: 2024-11-22

## 2024-11-21 NOTE — TELEPHONE ENCOUNTER
----- Message from Renan sent at 11/21/2024  3:26 PM CST -----  Contact: Self  Type: Needs Medical Advice    Who Called:  Patient    Symptoms (please be specific):  Yeast Infection  How long has patient had these symptoms:  2-3 Days    Pharmacy name and phone #:    [facility] #[id number] - [facility] [place] - [address]  [address]  [place] [address]  Phone: [phone] Fax: [phone]    Best Call Back Number: [phone]  Additional Information: Patient is requesting to have something called in for her for her symptoms. And a call back if possible so she knows it has been called in.

## 2024-12-04 ENCOUNTER — ROUTINE PRENATAL (OUTPATIENT)
Dept: OBSTETRICS AND GYNECOLOGY | Facility: CLINIC | Age: 39
End: 2024-12-04
Payer: MEDICAID

## 2024-12-04 VITALS
HEART RATE: 97 BPM | BODY MASS INDEX: 32.93 KG/M2 | DIASTOLIC BLOOD PRESSURE: 72 MMHG | WEIGHT: 174.25 LBS | SYSTOLIC BLOOD PRESSURE: 116 MMHG

## 2024-12-04 DIAGNOSIS — K59.01 SLOW TRANSIT CONSTIPATION: ICD-10-CM

## 2024-12-04 DIAGNOSIS — Z36.3 ENCOUNTER FOR ROUTINE SCREENING FOR MALFORMATION USING ULTRASOUND: ICD-10-CM

## 2024-12-04 DIAGNOSIS — O09.522 MULTIGRAVIDA OF ADVANCED MATERNAL AGE IN SECOND TRIMESTER: Primary | ICD-10-CM

## 2024-12-04 PROCEDURE — 99213 OFFICE O/P EST LOW 20 MIN: CPT | Mod: PBBFAC,TH,PO | Performed by: STUDENT IN AN ORGANIZED HEALTH CARE EDUCATION/TRAINING PROGRAM

## 2024-12-04 PROCEDURE — 99999 PR PBB SHADOW E&M-EST. PATIENT-LVL III: CPT | Mod: PBBFAC,,, | Performed by: STUDENT IN AN ORGANIZED HEALTH CARE EDUCATION/TRAINING PROGRAM

## 2024-12-04 PROCEDURE — 99213 OFFICE O/P EST LOW 20 MIN: CPT | Mod: TH,S$PBB,, | Performed by: STUDENT IN AN ORGANIZED HEALTH CARE EDUCATION/TRAINING PROGRAM

## 2024-12-04 RX ORDER — ASPIRIN 81 MG/1
81 TABLET ORAL DAILY
Qty: 90 TABLET | Refills: 3 | Status: SHIPPED | OUTPATIENT
Start: 2024-12-04 | End: 2025-12-04

## 2024-12-04 RX ORDER — POLYETHYLENE GLYCOL 3350 17 G/17G
17 POWDER, FOR SOLUTION ORAL DAILY
Qty: 100 EACH | Refills: 2 | Status: SHIPPED | OUTPATIENT
Start: 2024-12-04

## 2024-12-04 NOTE — PROGRESS NOTES
Faye Ocampo Moon  39 y.o.   MRN  [id number] PATIENT   1985   FINAL EDC:   5.29.25   by 8 wk US    Baby: ___ Girl    SO Name: Brian ALLERGIES:    NKDA      GBS: ___  Date: ___ OB PROBLEM LIST:    AMA, 40yo @ delivery    [x ] consider ASA 81mg    [  ] Targeted US    [  ] BPP weekly @ 32wks if >39    [  ] Growth US @ 32wks    [  ] deliver @ 39wks if >39 or IVF    Obesity, BMI 33    [x ] baseline spot PCR and HbA1C      Anemia      TO-DO THROUGHOUT PREGNANCY:  Depression Screen: ___    Flu Shot: declined 24  TDAP: ___  Infant feeding: ___   Plans for epidural: ___  Classes at hospital: ___  PP contraception: ___  Delivery Consent: ___  TOLAC counseling: ___  BTL counseling: ___  Pediatrician: ___ MEDICATIONS:    PNV  Zofran  Fe  bASA  Miralax   TODAY'S PROGRESS NOTE    Patient is doing well today. She reports no complaints.  She request Rx for Miralax due to constipation    She denies vaginal bleeding.  She denies leakage of fluid.  She denies contractions or abdominal pain.  She denies pelvic pressure.   She denies headaches, vision changes, right upper quadrant pain, non-dependent edema.    Vitals:    24 1554   BP: 116/72   Pulse: 97   Weight: 79.1 kg (174 lb 4.4 oz)       FHT: 154 by Vscan    Assessment:   1. IUP at 14w6d    1. Multigravida of advanced maternal age in second trimester  -     aspirin (ECOTRIN) 81 MG EC tablet; Take 1 tablet (81 mg total) by mouth once daily.  Dispense: 90 tablet; Refill: 3    2. Encounter for routine screening for malformation using ultrasound  -     US OB/GYN Procedure ([facility]) - Extended List; Future    3. Slow transit constipation  -     polyethylene glycol (MIRALAX) 17 gram PwPk; Take 17 g by mouth once daily.  Dispense: 100 each; Refill: 2           Plan:  1. Return in 4 week for anatomy US  2. bASA and Miralax Rx sent.   3. Encouraged adequate hydration  4. Declined flu shot today                        LABS   INITIAL LABS:    DATE:  10/11/24  MBT: O positive  AB SCREEN: negative  TP-ABS: negative  RUBELLA: Immune  Hep B sAg: negative  Hep C Ab: negative  HIV: negative  H/H: 10.3 / 32.6  PLT: 281  VARICELLA: Immune  HGB: normal  URINE CX: negative    PAP: PAP neg / HPV neg / Date: 24    EDITH/CHLAM/TRICH: negative x 2 / Date: 24   OTHER LABS:    10.11.24  A1c: 5.3  CMP: WNL  PCR: 0.05    DATE: 24  cfDNA ([id number]): XX, neg for T13, T18, T21   CARRIER SCREEN (Inheritest Core):  declined, had in prior pregnancy that was negative           ULTRASOUNDS   ANATOMY SCAN:    Use LLWOANATOMYSCAN      HISTORY   MEDICAL HISTORY:    Pre-pregnancy BMI = 33  anemia SURGICAL HISTORY:    Breast biopsy (Left)  Carpal Tunnel Release  Breast reduction       OBSTETRIC HISTORY   Month/Year Mode of Delivery EGA Wt. M/F Epidural Complications / Comments   2018  Term 6#5 Female none IOL   2016  Term 6#5 Male none IOL   2010  Term 6#13 Female Epidural    2005  Term 6#5 Male Epidural               SOCIAL HISTORY:    Smoker: non-smoker  Alcohol: denies  Drugs: denies  Relationship:   Domestic Violence: no  Lives with:   Education Level: Undergraduate Degree  Occupation: homemaker  Gnosticist: n/a GYN HISTORY:    PAP'S: no h/o abnormals  LAST PAP: NILM, negative HRHPV (24)  STD Hx: no past history  GENITAL HSV: no FAMILY HISTORY:    HTN: yes (mother and father)  DIABETES: yes (mother)  BLEEDING D/O: no  CLOTTING D/O: no  BIRTH DEFECTS: no  MENTAL DISABILITY: no  GYN CANCER: yes  Breast CA: M aunt, P aunt     No follow-ups on file.   Future Appointments   Date Time Provider Department Center   1/3/2025  2:00 [facility] [place] [facility]   2025  4:00 PM Manjula Wall MD [facility] [place] [facility]

## 2024-12-12 ENCOUNTER — PATIENT MESSAGE (OUTPATIENT)
Dept: OTHER | Facility: OTHER | Age: 39
End: 2024-12-12
Payer: MEDICAID

## 2024-12-19 ENCOUNTER — PATIENT MESSAGE (OUTPATIENT)
Dept: OTHER | Facility: OTHER | Age: 39
End: 2024-12-19
Payer: MEDICAID

## 2025-01-03 ENCOUNTER — HOSPITAL ENCOUNTER (OUTPATIENT)
Dept: OBSTETRICS AND GYNECOLOGY | Facility: CLINIC | Age: 40
Discharge: HOME | End: 2025-01-03
Attending: STUDENT IN AN ORGANIZED HEALTH CARE EDUCATION/TRAINING PROGRAM
Payer: MEDICAID

## 2025-01-03 DIAGNOSIS — Z36.3 ENCOUNTER FOR ROUTINE SCREENING FOR MALFORMATION USING ULTRASOUND: ICD-10-CM

## 2025-01-03 PROCEDURE — 76811 OB US DETAILED SNGL FETUS: CPT | Mod: 26,,, | Performed by: STUDENT IN AN ORGANIZED HEALTH CARE EDUCATION/TRAINING PROGRAM

## 2025-01-09 ENCOUNTER — ROUTINE PRENATAL (OUTPATIENT)
Dept: OBSTETRICS AND GYNECOLOGY | Facility: CLINIC | Age: 40
End: 2025-01-09
Payer: MEDICAID

## 2025-01-09 ENCOUNTER — PATIENT MESSAGE (OUTPATIENT)
Dept: OTHER | Facility: OTHER | Age: 40
End: 2025-01-09
Payer: MEDICAID

## 2025-01-09 VITALS
SYSTOLIC BLOOD PRESSURE: 106 MMHG | WEIGHT: 180.31 LBS | DIASTOLIC BLOOD PRESSURE: 62 MMHG | BODY MASS INDEX: 34.07 KG/M2 | HEART RATE: 68 BPM

## 2025-01-09 DIAGNOSIS — O09.522 MULTIGRAVIDA OF ADVANCED MATERNAL AGE IN SECOND TRIMESTER: Primary | ICD-10-CM

## 2025-01-09 PROCEDURE — 99999 PR PBB SHADOW E&M-EST. PATIENT-LVL II: CPT | Mod: PBBFAC,,, | Performed by: STUDENT IN AN ORGANIZED HEALTH CARE EDUCATION/TRAINING PROGRAM

## 2025-01-09 PROCEDURE — 99212 OFFICE O/P EST SF 10 MIN: CPT | Mod: PBBFAC,TH,PO | Performed by: STUDENT IN AN ORGANIZED HEALTH CARE EDUCATION/TRAINING PROGRAM

## 2025-01-09 NOTE — PROGRESS NOTES
Faye Damaris Moon  39 y.o.   MRN  [id number] PATIENT   1985   FINAL EDC:   5..   by 8 wk US    Baby: ___ Girl    SO Name: Brian ALLERGIES:    NKDA      GBS: ___  Date: ___ OB PROBLEM LIST:    AMA, 38yo @ delivery    [x ] consider ASA 81mg    [  ] Targeted US    [  ] BPP weekly @ 32wks if >39    [  ] Growth US @ 32wks    [  ] deliver @ 39wks if >39 or IVF    Obesity, BMI 33    [x ] baseline spot PCR and HbA1C      Anemia    Marginal cord insertion, repeat growth @ 32 weeks        TO-DO THROUGHOUT PREGNANCY:  Depression Screen: 1.925    Flu Shot: declined 12.  TDAP: ___  Infant feeding: ___   Plans for epidural: ___  Classes at hospital: ___  PP contraception: ___  Delivery Consent: ___    BTL counseling: ___  Pediatrician: ___ MEDICATIONS:    PNV  Zofran  Fe  bASA  Miralax   TODAY'S PROGRESS NOTE    Patient is doing well today. She reports no complaints.  +FM    She denies vaginal bleeding.  She denies leakage of fluid.  She denies contractions or abdominal pain.  She denies pelvic pressure.   She denies headaches, vision changes, right upper quadrant pain, non-dependent edema.    Vitals:    25 1608   BP: 106/62   Pulse: 68   Weight: 81.8 kg (180 lb 5.4 oz)       FH: 20  FHT: 158    Assessment:   1. IUP at 20w0d    1. Multigravida of advanced maternal age in second trimester             Plan:  1. Return in 3 weeks for consents  2. 2T precautions given  3. Schedule F/U anatomy on RV                      LABS   INITIAL LABS:    DATE: 10/11/24  MBT: O positive  AB SCREEN: negative  TP-ABS: negative  RUBELLA: Immune  Hep B sAg: negative  Hep C Ab: negative  HIV: negative  H/H: 10.3 / 32.6  PLT: 281  VARICELLA: Immune  HGB: normal  URINE CX: negative    PAP: PAP neg / HPV neg / Date: 24    EDITH/CHLAM/TRICH: negative x 2 / Date: 24   OTHER LABS:    10.11.24  A1c: 5.3  CMP: WNL  PCR: 0.05    DATE: 24  cfDNA ([id number]): XX, neg for T13, T18, T21   CARRIER SCREEN  (Inheritest Core):  declined, had in prior pregnancy that was negative           ULTRASOUNDS   ANATOMY SCAN:    DATE: 1/3/25 @ 19wks:  SEX: Female  EFW: 276 g  ANATOMY: wnl but incomplete  PLACENTA: posterior, marginal insertion  CERVIX: normal length  OTHER: recommend F/U to complete anatomy and repeat growth at 32 weeks        HISTORY   MEDICAL HISTORY:    Pre-pregnancy BMI = 33  anemia SURGICAL HISTORY:    Breast biopsy (Left)  Carpal Tunnel Release  Breast reduction       OBSTETRIC HISTORY   Month/Year Mode of Delivery EGA Wt. M/F Epidural Complications / Comments   2018  Term 6#5 Female none IOL   2016  Term 6#5 Male none IOL   2010  Term 6#13 Female Epidural    2005  Term 6#5 Male Epidural               SOCIAL HISTORY:    Smoker: non-smoker  Alcohol: denies  Drugs: denies  Relationship:   Domestic Violence: no  Lives with:   Education Level: Undergraduate Degree  Occupation: homemaker  Tenriism: n/a GYN HISTORY:    PAP'S: no h/o abnormals  LAST PAP: NILM, negative HRHPV (8.29.24)  STD Hx: no past history  GENITAL HSV: no FAMILY HISTORY:    HTN: yes (mother and father)  DIABETES: yes (mother)  BLEEDING D/O: no  CLOTTING D/O: no  BIRTH DEFECTS: no  MENTAL DISABILITY: no  GYN CANCER: yes  Breast CA: M aunt, P aunt     No follow-ups on file.   Future Appointments   Date Time Provider Department Center   2025  3:00 PM Manjula Wall MD [facility] [place]

## 2025-01-31 ENCOUNTER — ROUTINE PRENATAL (OUTPATIENT)
Dept: OBSTETRICS AND GYNECOLOGY | Facility: CLINIC | Age: 40
End: 2025-01-31
Payer: MEDICAID

## 2025-01-31 VITALS
HEART RATE: 64 BPM | BODY MASS INDEX: 33.8 KG/M2 | SYSTOLIC BLOOD PRESSURE: 110 MMHG | WEIGHT: 178.88 LBS | DIASTOLIC BLOOD PRESSURE: 60 MMHG

## 2025-01-31 DIAGNOSIS — O09.522 MULTIGRAVIDA OF ADVANCED MATERNAL AGE IN SECOND TRIMESTER: Primary | ICD-10-CM

## 2025-01-31 PROCEDURE — 99213 OFFICE O/P EST LOW 20 MIN: CPT | Mod: PBBFAC,TH,PO | Performed by: STUDENT IN AN ORGANIZED HEALTH CARE EDUCATION/TRAINING PROGRAM

## 2025-01-31 PROCEDURE — 59425 ANTEPARTUM CARE ONLY: CPT | Mod: TH,S$PBB,, | Performed by: STUDENT IN AN ORGANIZED HEALTH CARE EDUCATION/TRAINING PROGRAM

## 2025-01-31 PROCEDURE — 99999 PR PBB SHADOW E&M-EST. PATIENT-LVL III: CPT | Mod: PBBFAC,,, | Performed by: STUDENT IN AN ORGANIZED HEALTH CARE EDUCATION/TRAINING PROGRAM

## 2025-01-31 NOTE — PROGRESS NOTES
Faye Ocampo Moon  39 y.o.   MRN  [id number] PATIENT   1985   FINAL EDC:   5.29.25   by 8 wk US    Baby: ___ Girl    SO Name: Brian ALLERGIES:    NKDA      GBS: ___  Date: ___ OB PROBLEM LIST:    AMA, 40yo @ delivery    [x ] consider ASA 81mg    [x ] Targeted US    [  ] BPP weekly @ 32wks if >39    [  ] Growth US @ 32wks    [  ] deliver @ 39wks if >39 or IVF    Obesity, BMI 33    [x ] baseline spot PCR and HbA1C      Anemia    Marginal cord insertion, repeat growth @ 32 weeks        TO-DO THROUGHOUT PREGNANCY:  Depression Screen: 1.925    Flu Shot: declined 12.4.24  TDAP: ___  Infant feeding: ___   Plans for epidural: no  Classes at hospital: ___  PP contraception: ___  Delivery Consent: ___    BTL counseling: ___  Pediatrician: ___ MEDICATIONS:    PNV  Zofran  Fe  bASA  Miralax   TODAY'S PROGRESS NOTE    Patient is doing well today. She reports no complaints.  +FM    She denies vaginal bleeding.  She denies leakage of fluid.  She denies contractions or abdominal pain.  She denies pelvic pressure.   She denies headaches, vision changes, right upper quadrant pain, non-dependent edema.    Vitals:    25 1541   BP: 110/60   Pulse: 64   Weight: 81.1 kg (178 lb 14.5 oz)       FH: 20  FHT: 158    Assessment:   1. IUP at 23w1d    1. Multigravida of advanced maternal age in second trimester          Plan:  1. Return in 3 weeks   2. 2T precautions given  3. Today we obtained informed consent for delivery and blood tranfusion.  We reviewed slides with visual representations that explained in detail what to expect in labor and delivery.  We addressed the fact that our role is to safely guide her and her baby through the birthing process although we are not in control of the process, and neither is she.  We will respond to what her baby and her body give us, as determined by FHR monitoring, tocometry, labor exams, and other clinical indicators.  She understands many emergencies can occur during  labor and delivery, and our interventions aren't always adequate or timely enough to prevent complications.  Two emergencies that we discussed in particular are shoulder dystocia and hemorrhage, and these were explained in detail.  She agrees to the use or administration of the following if indicated:  IV fluids, antibiotics, cervical ripening (mechanical and medicinal methods), oxytocin (Pitocin), amniotomy, IV or IM pain medications, epidural or spinal anesthesia, general anesthesia, internal monitors (FSE and IUPC), vacuum or forceps delivery, , episiotomy, medications and mechanical devices for treating hemorrhage, surgical interventions, blood transfusion, and other medicines and interventions considered important for her or her baby's health.  The patient understands that labor, delivery, and the above listed interventions can pose many risks to her and to her baby, which include but are not limited to, viral/bacterial infection, allergic reaction, temporary or permanent bodily injury or disability, brain damage, nerve damage, DVT/PE, and death.  She had adequate opportunity to ask questions, and they were answered to her apparent satisfaction.                        LABS   INITIAL LABS:    DATE: 10/11/24  MBT: O positive  AB SCREEN: negative  TP-ABS: negative  RUBELLA: Immune  Hep B sAg: negative  Hep C Ab: negative  HIV: negative  H/H: 10.3 / 32.6  PLT: 281  VARICELLA: Immune  HGB: normal  URINE CX: negative    PAP: PAP neg / HPV neg / Date: 24    EDITH/CHLAM/TRICH: negative x 2 / Date: 24   OTHER LABS:    10.11.24  A1c: 5.3  CMP: WNL  PCR: 0.05    DATE: 24  cfDNA ([id number]): XX, neg for T13, T18, T21   CARRIER SCREEN (Inheritest Core):  declined, had in prior pregnancy that was negative           ULTRASOUNDS   ANATOMY SCAN:    DATE: 1/3/25 @ 19wks:  SEX: Female  EFW: 276 g  ANATOMY: wnl but incomplete  PLACENTA: posterior, marginal insertion  CERVIX: normal length  OTHER: recommend  F/U to complete anatomy and repeat growth at 32 weeks          HISTORY   MEDICAL HISTORY:    Pre-pregnancy BMI = 33  anemia SURGICAL HISTORY:    Breast biopsy (Left)  Carpal Tunnel Release  Breast reduction       OBSTETRIC HISTORY   Month/Year Mode of Delivery EGA Wt. M/F Epidural Complications / Comments   2018  Term 6#5 Female none IOL   2016  Term 6#5 Male none IOL   2010  Term 6#13 Female Epidural    2005  Term 6#5 Male Epidural               SOCIAL HISTORY:    Smoker: non-smoker  Alcohol: denies  Drugs: denies  Relationship:   Domestic Violence: no  Lives with:   Education Level: Undergraduate Degree  Occupation: homemaker  Moravian: n/a GYN HISTORY:    PAP'S: no h/o abnormals  LAST PAP: NILM, negative HRHPV (8.29.24)  STD Hx: no past history  GENITAL HSV: no FAMILY HISTORY:    HTN: yes (mother and father)  DIABETES: yes (mother)  BLEEDING D/O: no  CLOTTING D/O: no  BIRTH DEFECTS: no  MENTAL DISABILITY: no  GYN CANCER: yes  Breast CA: M aunt, P aunt     No follow-ups on file.   Future Appointments   Date Time Provider Department Center   2025 11:00 AM Manjula Wall MD [facility] KATHIA FERNANDES

## 2025-02-03 ENCOUNTER — PATIENT MESSAGE (OUTPATIENT)
Dept: OBSTETRICS AND GYNECOLOGY | Facility: CLINIC | Age: 40
End: 2025-02-03
Payer: MEDICAID

## 2025-02-03 NOTE — LETTER
February 4, 2025    Faye Damaris Mustafa  [address]  [place] [address]              [facility]  [address]  [address]  [place] [address]  Phone: [phone]  Fax: [phone]    To Whom It May Concern:    Ms. Mustafa is currently under our care for pregnancy.  Estimated Date of Delivery: 5/29/2025        Sincerely,    Mya Mcfarlane MA

## 2025-02-06 ENCOUNTER — PATIENT MESSAGE (OUTPATIENT)
Dept: OTHER | Facility: OTHER | Age: 40
End: 2025-02-06
Payer: MEDICAID

## 2025-02-07 ENCOUNTER — TELEPHONE (OUTPATIENT)
Dept: OBSTETRICS AND GYNECOLOGY | Facility: CLINIC | Age: 40
End: 2025-02-07
Payer: MEDICAID

## 2025-02-07 NOTE — TELEPHONE ENCOUNTER
Left message for pt to call back regarding message for medication request. Pt to call back, will also send mychart message.

## 2025-02-07 NOTE — TELEPHONE ENCOUNTER
----- Message from Tash sent at 2/7/2025 12:49 PM CST -----  Regarding: advice  Contact: patient  Type: Needs Medical Advice  Who Called:  patient  Symptoms (please be specific):  congestion exposed to covid  How long has patient had these symptoms:    Pharmacy name and phone #:    [id number] [place] - [address]  [address]  [place] [address]  Phone: [phone] Fax: [phone]      Best Call Back Number: [phone] (home)     Additional Information: Please call patient to advise.  Thanks!

## 2025-02-20 ENCOUNTER — PATIENT MESSAGE (OUTPATIENT)
Dept: OTHER | Facility: OTHER | Age: 40
End: 2025-02-20
Payer: MEDICAID

## 2025-02-21 ENCOUNTER — ROUTINE PRENATAL (OUTPATIENT)
Dept: OBSTETRICS AND GYNECOLOGY | Facility: CLINIC | Age: 40
End: 2025-02-21
Payer: MEDICAID

## 2025-02-21 VITALS
BODY MASS INDEX: 33.95 KG/M2 | DIASTOLIC BLOOD PRESSURE: 70 MMHG | SYSTOLIC BLOOD PRESSURE: 128 MMHG | WEIGHT: 179.69 LBS | HEART RATE: 72 BPM

## 2025-02-21 DIAGNOSIS — O09.522 MULTIGRAVIDA OF ADVANCED MATERNAL AGE IN SECOND TRIMESTER: ICD-10-CM

## 2025-02-21 DIAGNOSIS — O09.521 MULTIGRAVIDA OF ADVANCED MATERNAL AGE IN FIRST TRIMESTER: Primary | ICD-10-CM

## 2025-02-21 DIAGNOSIS — Z36.2 ENCOUNTER FOR OTHER ANTENATAL SCREENING FOLLOW-UP: ICD-10-CM

## 2025-02-21 PROCEDURE — 99213 OFFICE O/P EST LOW 20 MIN: CPT | Mod: PBBFAC,TH,PO | Performed by: STUDENT IN AN ORGANIZED HEALTH CARE EDUCATION/TRAINING PROGRAM

## 2025-02-21 NOTE — PROGRESS NOTES
Faye Ocampo Moon  39 y.o.   MRN  [id number] PATIENT   1985   FINAL EDC:   5.29.25   by 8 wk US    Baby: ___ Girl    SO Name: Brian ALLERGIES:    NKDA      GBS: ___  Date: ___ OB PROBLEM LIST:    AMA, 38yo @ delivery    [x ] consider ASA 81mg    [x ] Targeted US    [  ] BPP weekly @ 32wks if >39    [  ] Growth US @ 32wks    [  ] deliver @ 39wks if >39 or IVF    Obesity, BMI 33    [x ] baseline spot PCR and HbA1C      Anemia    Marginal cord insertion, repeat growth @ 32 weeks        TO-DO THROUGHOUT PREGNANCY:  Depression Screen: 1.925    Flu Shot: declined 12.4.24  TDAP: ___  Infant feeding: ___   Plans for epidural: no  Classes at hospital: ___  PP contraception: ___  Delivery Consent: ___    BTL counseling: ___  Pediatrician: ___ MEDICATIONS:    PNV  Zofran  Fe  bASA  Miralax   TODAY'S PROGRESS NOTE    Patient is doing well today. She reports no complaints.  +FM    She denies vaginal bleeding.  She denies leakage of fluid.  She denies contractions or abdominal pain.  She denies pelvic pressure.   She denies headaches, vision changes, right upper quadrant pain, non-dependent edema.    Vitals:    25 1123   BP: 128/70   Pulse: 72   Weight: 81.5 kg (179 lb 10.8 oz)       FH: 26  FHT: 155    Assessment:   1. IUP at 23w1d    1. Multigravida of advanced maternal age in first trimester  -     OB Glucose Screen; Future; Expected date: 2025  -     Ferritin; Future; Expected date: 2025  -     CBC Auto Differential; Future; Expected date: 2025  -     Treponema Pallidium Antibodies IgG, IgM; Future; Expected date: 2025    2. Encounter for other  screening follow-up  -      OB/GYN Procedure ([facility]) - Extended List; Future          Plan:  1. Return in 3 weeks   2. 3T labs and 1h GTT ordered  3. F/U anatomy scheduled  4. 2T precautions given                        LABS   INITIAL LABS:    DATE: 10/11/24  MBT: O positive  AB SCREEN: negative  TP-ABS:  negative  RUBELLA: Immune  Hep B sAg: negative  Hep C Ab: negative  HIV: negative  H/H: 10.3 / 32.6  PLT: 281  VARICELLA: Immune  HGB: normal  URINE CX: negative    PAP: PAP neg / HPV neg / Date: 24    EDITH/CHLAM/TRICH: negative x 2 / Date: 24   OTHER LABS:    10.11.24  A1c: 5.3  CMP: WNL  PCR: 0.05    DATE: 24  cfDNA ([id number]): XX, neg for T13, T18, T21   CARRIER SCREEN (Inheritest Core):  declined, had in prior pregnancy that was negative           ULTRASOUNDS   ANATOMY SCAN:    DATE: 1/3/25 @ 19wks:  SEX: Female  EFW: 276 g  ANATOMY: wnl but incomplete  PLACENTA: posterior, marginal insertion  CERVIX: normal length  OTHER: recommend F/U to complete anatomy and repeat growth at 32 weeks          HISTORY   MEDICAL HISTORY:    Pre-pregnancy BMI = 33  anemia SURGICAL HISTORY:    Breast biopsy (Left)  Carpal Tunnel Release  Breast reduction       OBSTETRIC HISTORY   Month/Year Mode of Delivery EGA Wt. M/F Epidural Complications / Comments   2018  Term 6#5 Female none IOL   2016  Term 6#5 Male none IOL   2010  Term 6#13 Female Epidural    2005  Term 6#5 Male Epidural               SOCIAL HISTORY:    Smoker: non-smoker  Alcohol: denies  Drugs: denies  Relationship:   Domestic Violence: no  Lives with:   Education Level: Undergraduate Degree  Occupation: homemaker  Yazdanism: n/a GYN HISTORY:    PAP'S: no h/o abnormals  LAST PAP: NILM, negative HRHPV (24)  STD Hx: no past history  GENITAL HSV: no FAMILY HISTORY:    HTN: yes (mother and father)  DIABETES: yes (mother)  BLEEDING D/O: no  CLOTTING D/O: no  BIRTH DEFECTS: no  MENTAL DISABILITY: no  GYN CANCER: yes  Breast CA: M aunt, P aunt     No follow-ups on file.   Future Appointments   Date Time Provider Department Center   3/6/2025  3:00 PM ULTRASOUND, [facility] Clive FERNANDES

## 2025-02-21 NOTE — PATIENT INSTRUCTIONS
To schedule classes (see below for what's offered) at the hospital, call [phone].    Have a question or concern?    PRO TIP: Program these phone numbers in your cell phone!    for an emergency  call 911 or go to the nearest hospital Especially after 20 weeks of the pregnancy, please remember that  Labor & Delivery is at [facility].  There is no L&D at [facility] (formerly called [facility]).  After hours you can only access L&D through the Emergency Room (entrance on [facility]).   [facility] Nurse Care Advice Line  [phone] At any time during your pregnancy,  you can speak to a nurse 24-7.   for non-urgent issues, send us a  message in [employer] Consider calling the Nurse Care Advice Line if it's a weekend or  toward the end of the work-day since  [employer] and phone messages may not be answered for a day or two.   for non-urgent issues, call the clinic  [phone], Option 3    Labor & Delivery  [phone] Starting at 20 weeks of the pregnancy,  you can speak to a nurse on L&D 24-7.     SECOND TRIMESTER  14+0 - 28+6 weeks    Adapting to Pregnancy: Second Trimester   Keep up the healthy habits you started in your first trimester.  It's not too late to make better choices and drop bad habits - your baby's counting on you!  Most women enjoy this middle part of the pregnancy: first trimester fatigue and morning sickness are probably behind you, and your belly's not yet getting in the way physically.    Your To-Do List  There are several things you should start working on.  More information on these topics can be found in your OB Welcome Packet and the A-Z Book.    Choose a pediatrician for your baby.  Sign up for a tour and classes at the hospital.  All classes are free of charge if you are delivering at [facility].  Call [phone] to register for any of these classes:  Baby Love (learn about the delivery process and caring for a )  Big Brother / Big Sister Class (to help siblings prepare for  baby)  Lamaze (a 4 week class to learn about natural interventions for labor)  Breastfeeding (get a head start learning about breastfeeding)  Work on some methods for coping with the pains of labor.  A good bit of labor happens before you can get an epidural, and you'll feel more confident if you have a plan that you've practiced.  We encourage everyone to breastfeed if they can (and most women can!).  Please ask us questions if you have them.  Decide what you'd like to use for contraception (birth control) after this baby is born.  If you're having a boy, let us know if you plan to have him circumcised.    Pregnancy Milestones  After week 16, avoid lying on your back for more than a few minutes. Instead, lie on your side and switch sides often.  Between 19 and 22 weeks you'll have an ultrasound to look at the baby's anatomy and determine the gender (if you want to know and don't already know). This is around the same time when you should start feeling baby's movements and kicks.  Your gestational diabetes test will be done around 28 weeks.  We'll give you a TDAP booster shot so that your baby is protected from Whooping Cough (pertussis) his/her first few months of life.    When You Travel   The second trimester is a good time for travel. Talk to your health care provider about any special plans you may need to make. Always:   Wear a seat belt. Fasten the lap part under your belly. Wear the shoulder part also.   Take frequent breaks during long trips by car or plane. Move around to stretch your legs.   Drink plenty of fluids on flights. The air in plane cabins is very dry.   Avoid hot climates or high altitudes if you are not used to them.   Avoid places where the food and water might make you sick.    Intimacy  Unless your health care provider tells you otherwise, it's perfectly fine to continue having sex. In fact, many women find sex in the second trimester quite enjoyable due to increased blood supply to the  pelvic area.    Baby!  Organs continue to develop and begin to function, there's formation of eyebrows / eyelashes / fingernails, skin is wrinkled and covered in vernix, genitals develop, a fine hair called lanugo covers the body, and baby is busy: kicking, sleeping, swallowing amniotic fluid, listening to you, passing urine, and sucking those thumbs.    OTHER INFORMATION:  If your provider orders labs or other studies, you probably won't hear from us unless something is abnormal.  How we define normal is different for many things in pregnancy.  This includes several of the numbers on a Complete Blood Count (CBC).  If your result is flagged as abnormal in [facility] but you don't hear from us, it's probably because things are actually normal based on where you are in your pregnancy.

## 2025-03-06 ENCOUNTER — HOSPITAL ENCOUNTER (OUTPATIENT)
Dept: OBSTETRICS AND GYNECOLOGY | Facility: CLINIC | Age: 40
Discharge: HOME | End: 2025-03-06
Attending: STUDENT IN AN ORGANIZED HEALTH CARE EDUCATION/TRAINING PROGRAM
Payer: MEDICAID

## 2025-03-06 ENCOUNTER — LAB VISIT (OUTPATIENT)
Dept: LAB | Facility: HOSPITAL | Age: 40
End: 2025-03-06
Attending: STUDENT IN AN ORGANIZED HEALTH CARE EDUCATION/TRAINING PROGRAM
Payer: MEDICAID

## 2025-03-06 ENCOUNTER — PATIENT MESSAGE (OUTPATIENT)
Dept: OTHER | Facility: OTHER | Age: 40
End: 2025-03-06
Payer: MEDICAID

## 2025-03-06 DIAGNOSIS — O09.521 MULTIGRAVIDA OF ADVANCED MATERNAL AGE IN FIRST TRIMESTER: ICD-10-CM

## 2025-03-06 DIAGNOSIS — Z36.2 ENCOUNTER FOR OTHER ANTENATAL SCREENING FOLLOW-UP: ICD-10-CM

## 2025-03-06 LAB
BASOPHILS # BLD AUTO: 0.03 K/UL (ref 0–0.2)
BASOPHILS NFR BLD: 0.4 % (ref 0–1.9)
DIFFERENTIAL METHOD BLD: ABNORMAL
EOSINOPHIL # BLD AUTO: 0.2 K/UL (ref 0–0.5)
EOSINOPHIL NFR BLD: 2.5 % (ref 0–8)
ERYTHROCYTE [DISTWIDTH] IN BLOOD BY AUTOMATED COUNT: 14.6 % (ref 11.5–14.5)
GLUCOSE SERPL-MCNC: 106 MG/DL (ref 70–140)
HCT VFR BLD AUTO: 31.3 % (ref 37–48.5)
HGB BLD-MCNC: 10.2 G/DL (ref 12–16)
IMM GRANULOCYTES # BLD AUTO: 0.05 K/UL (ref 0–0.04)
IMM GRANULOCYTES NFR BLD AUTO: 0.7 % (ref 0–0.5)
LYMPHOCYTES # BLD AUTO: 1.5 K/UL (ref 1–4.8)
LYMPHOCYTES NFR BLD: 19.9 % (ref 18–48)
MCH RBC QN AUTO: 26.5 PG (ref 27–31)
MCHC RBC AUTO-ENTMCNC: 32.6 G/DL (ref 32–36)
MCV RBC AUTO: 81 FL (ref 82–98)
MONOCYTES # BLD AUTO: 0.5 K/UL (ref 0.3–1)
MONOCYTES NFR BLD: 5.9 % (ref 4–15)
NEUTROPHILS # BLD AUTO: 5.4 K/UL (ref 1.8–7.7)
NEUTROPHILS NFR BLD: 70.6 % (ref 38–73)
NRBC BLD-RTO: 0 /100 WBC
PLATELET # BLD AUTO: 193 K/UL (ref 150–450)
PMV BLD AUTO: 13 FL (ref 9.2–12.9)
RBC # BLD AUTO: 3.85 M/UL (ref 4–5.4)
TREPONEMA PALLIDUM IGG+IGM AB [PRESENCE] IN SERUM OR PLASMA BY IMMUNOASSAY: NONREACTIVE
WBC # BLD AUTO: 7.64 K/UL (ref 3.9–12.7)

## 2025-03-06 PROCEDURE — 76816 OB US FOLLOW-UP PER FETUS: CPT | Mod: 26,,, | Performed by: OBSTETRICS & GYNECOLOGY

## 2025-03-06 PROCEDURE — 82950 GLUCOSE TEST: CPT | Performed by: STUDENT IN AN ORGANIZED HEALTH CARE EDUCATION/TRAINING PROGRAM

## 2025-03-06 PROCEDURE — 36415 COLL VENOUS BLD VENIPUNCTURE: CPT | Mod: PO | Performed by: STUDENT IN AN ORGANIZED HEALTH CARE EDUCATION/TRAINING PROGRAM

## 2025-03-06 PROCEDURE — 82728 ASSAY OF FERRITIN: CPT | Performed by: STUDENT IN AN ORGANIZED HEALTH CARE EDUCATION/TRAINING PROGRAM

## 2025-03-06 PROCEDURE — 85025 COMPLETE CBC W/AUTO DIFF WBC: CPT | Performed by: STUDENT IN AN ORGANIZED HEALTH CARE EDUCATION/TRAINING PROGRAM

## 2025-03-06 PROCEDURE — 86593 SYPHILIS TEST NON-TREP QUANT: CPT | Performed by: STUDENT IN AN ORGANIZED HEALTH CARE EDUCATION/TRAINING PROGRAM

## 2025-03-07 ENCOUNTER — RESULTS FOLLOW-UP (OUTPATIENT)
Dept: OBSTETRICS AND GYNECOLOGY | Facility: CLINIC | Age: 40
End: 2025-03-07

## 2025-03-07 DIAGNOSIS — O99.013 ANEMIA AFFECTING PREGNANCY IN THIRD TRIMESTER: Primary | ICD-10-CM

## 2025-03-07 LAB — FERRITIN SERPL-MCNC: 17 NG/ML (ref 20–300)

## 2025-03-07 RX ORDER — FERROUS SULFATE 325(65) MG
325 TABLET ORAL
Qty: 30 TABLET | Refills: 2 | Status: SHIPPED | OUTPATIENT
Start: 2025-03-07

## 2025-03-14 ENCOUNTER — ROUTINE PRENATAL (OUTPATIENT)
Dept: OBSTETRICS AND GYNECOLOGY | Facility: CLINIC | Age: 40
End: 2025-03-14
Payer: MEDICAID

## 2025-03-14 VITALS
WEIGHT: 176.69 LBS | BODY MASS INDEX: 33.39 KG/M2 | SYSTOLIC BLOOD PRESSURE: 110 MMHG | DIASTOLIC BLOOD PRESSURE: 72 MMHG

## 2025-03-14 DIAGNOSIS — D50.8 OTHER IRON DEFICIENCY ANEMIA: ICD-10-CM

## 2025-03-14 DIAGNOSIS — O09.523 MULTIGRAVIDA OF ADVANCED MATERNAL AGE IN THIRD TRIMESTER: Primary | ICD-10-CM

## 2025-03-14 PROBLEM — D50.9 IRON DEFICIENCY ANEMIA: Status: ACTIVE | Noted: 2025-03-14

## 2025-03-14 PROCEDURE — 99212 OFFICE O/P EST SF 10 MIN: CPT | Mod: PBBFAC,TH,PO | Performed by: STUDENT IN AN ORGANIZED HEALTH CARE EDUCATION/TRAINING PROGRAM

## 2025-03-14 PROCEDURE — 99999 PR PBB SHADOW E&M-EST. PATIENT-LVL II: CPT | Mod: PBBFAC,,, | Performed by: STUDENT IN AN ORGANIZED HEALTH CARE EDUCATION/TRAINING PROGRAM

## 2025-03-14 NOTE — PATIENT INSTRUCTIONS
To schedule classes (see below for what's offered) at the hospital, call [phone].    Have a question or concern?    PRO TIP: Program these phone numbers in your cell phone!    for an emergency  call 911 or go to the nearest hospital Especially after 20 weeks of the pregnancy, please remember that  Labor & Delivery is at [facility].  There is no L&D at [facility]  After hours you can only access L&D through the Emergency Room (entrance on [facility]).   [facility] Nurse Care Advice Line  [phone] At any time during your pregnancy,  you can speak to a nurse 24-7.   for non-urgent issues, send us a  message in [employer] Consider calling the Nurse Care Advice Line if it's a weekend or  toward the end of the work-day since  [employer] and phone messages may not be answered for a day or two.   for non-urgent issues, call the clinic  [phone], Option 3    Labor & Delivery  [phone] Starting at 20 weeks of the pregnancy,  you can speak to a nurse on L&D 24-7.     THIRD TRIMESTER  29+0 - 42 weeks    Adapting to Pregnancy: Third Trimester   Some physical discomforts may seem worse in the final weeks of pregnancy. Simple lifestyle changes can help as you keep good posture and use good body mechanics.  Pay attention to your changing center of gravity.  Be kind to yourself and know your limits - your body is hosting an entire other human!  Ask for help if you need it.  Take fiber supplements, drink lots of water, and avoid prolonged sitting or standing to prevent constipation and hemorrhoids.  Be sure you're getting enough rest: avoid caffeine after 3pm, use a warm bath to relax before bedtime, ask for back/neck/shoulder massages from your partner, try drinking warm decaf tea or milk at bedtime, get heartburn under control.  Speaking of heartburnavoid spicy / acidic / sugary foods, especially in the evenings.  Eat slowly and in small amounts, and avoiding eating during the 2  hours before bedtime.  Try sleeping with your upper body elevated.    Your To-Do List  If you haven't already, get these important things done!  A few new tasks include having the carseat ready, having hospital bags packed, and making arrangements if needed for other children and/or pets while you're in the hospital.    We'll ask you to pre-register at the hospital around 36 weeks so you have a little less paperwork to do when the big day arrives.  You can walk-in at any time.  Go in the hospital's main entrance on [address] (near the pharmacy).  Walk in to Registration, which is across from the Information Desk.  You'll need your ID and insurance cards.    More information on these topics can be found in your OB Welcome Packet and the A-Z Book:  Choose a pediatrician for your baby.  Sign up for a tour and classes at the hospital.  All classes are free of charge if you are delivering at [facility].  Call [phone] to register for any of these classes:  Baby Love (learn about the delivery process and caring for a )  Big Brother / Big Sister Class (to help siblings prepare for baby)  Lamaze (a 4 week class to learn about natural interventions for labor)  Breastfeeding (get a head start learning about breastfeeding)  Work on some methods for coping with the pains of labor.  A good bit of labor happens before you can get an epidural, and you'll feel more confident if you have a plan that you've practiced.  We encourage everyone to breastfeed if they can (and most women can!).  Please ask us questions if you have them.  Decide what you'd like to use for contraception (birth control) after this baby is born.  If you're having a boy, let us know if you plan to have him circumcised.    Things to Look Out For  The Four Questions (please read more about these in your OB Welcome Packet): 1) Baby's Movements, 2) Contractions / Cramping, 3) Vaginal bleeding, 4) Leaking fluids  Mood swings and depression - some mood swings are  expected in pregnancy, but please ask for help if you are feeling overwhelmed or sad all the time.  Headaches that don't improve with rest, drinking water, and tylenol.  Severe swelling, especially of the face and hands. Remember some swelling of the lower legs is normal, especially if you have been on your feet for some time.    Intimacy   Unless your doctor tells you not to, it's still fine to continue having sex. The third trimester though can be a challenge comfort-wise. Try different positions and see what's best for you.    Baby!  Baby kicks and stretches despite running low on room, lanugo disappears, baby gains about a half of a pound per week, and bones harden (except for the skull, which remains soft and flexible for delivery).    OTHER INFORMATION:  If your provider orders labs or other studies, you probably won't hear from us unless something is abnormal.  How we define normal is different for many things in pregnancy.  This includes several of the numbers on a Complete Blood Count (CBC).  If your result is flagged as abnormal in MyChart but you don't hear from us, it's probably because things are actually normal based on where you are in your pregnancy.

## 2025-03-14 NOTE — PROGRESS NOTES
Fayejasmine Ocampo Moon  39 y.o.   MRN  [id number] PATIENT   1985   FINAL EDC:   25   by 8 wk US    Baby: Brian    SO Name: Brian ALLERGIES:    NKDA      GBS: ___  Date: ___ OB PROBLEM LIST:    AMA, 40yo @ delivery    [x ] consider ASA 81mg    [x ] Targeted US    [  ] BPP weekly @ 32wks if >39    [  ] Growth US @ 32wks      Obesity, BMI 33    [x ] baseline spot PCR and HbA1C      Anemia    Marginal cord insertion, repeat growth @ 32 weeks        TO-DO THROUGHOUT PREGNANCY:  Depression Screen: 1.925    Flu Shot: declined 24  TDAP: declined 3.14.25  Infant feeding: breast   Plans for epidural: no  Classes at hospital: ___  PP contraception: declined  Delivery Consent: 25    Pediatrician: ___ MEDICATIONS:    PNV  Zofran  Fe  bASA  Miralax   TODAY'S PROGRESS NOTE    Patient is doing well today. She reports no complaints.  +FM    She denies vaginal bleeding.  She denies leakage of fluid.  She denies contractions or abdominal pain.  She denies pelvic pressure.   She denies headaches, vision changes, right upper quadrant pain, non-dependent edema.    Vitals:    25 1415   BP: 110/72   Weight: 80.2 kg (176 lb 11.2 oz)       FH: 29  FHT: 145    Assessment:   1. IUP at 29w1d    1. Multigravida of advanced maternal age in third trimester  -     US OB/GYN Procedure ([facility]) - Extended List; Future; Expected date: 2025    2. Other iron deficiency anemia          Plan:  1. Return in 3 weeks   2. Labs reviewed  3. 32 wk US scheduled  4. 3T precautions given  5. Declined Tdap                      LABS   INITIAL LABS:    DATE: 10/11/24  MBT: O positive  AB SCREEN: negative  TP-ABS: negative  RUBELLA: Immune  Hep B sAg: negative  Hep C Ab: negative  HIV: negative  H/H: 10.3 / 32.6  PLT: 281  VARICELLA: Immune  HGB: normal  URINE CX: negative    PAP: PAP neg / HPV neg / Date: 24    EDITH/CHLAM/TRICH: negative x 2 / Date: 24   OTHER LABS:    10.11.24  A1c: 5.3  CMP:  WNL  PCR: 0.05    DATE: 24  cfDNA ([id number]): XX, neg for T13, T18, T21   CARRIER SCREEN (Inheritest Core):  declined, had in prior pregnancy that was negative      Date 3.6.25  H/H: 10.2 / 31.3  PLT: 193  FERRITIN: 17  FTA-ABS: negative    1HR GDM SCREEN:   Lab Results   Component Value Date    OBGLUCOSESCR 106 2025          ULTRASOUNDS   ANATOMY SCAN:    DATE: 1/3/25 @ 19wks:  SEX: Female  EFW: 276 g  ANATOMY: wnl but incomplete  PLACENTA: posterior, marginal insertion  CERVIX: normal length  OTHER: recommend F/U to complete anatomy and repeat growth at 32 weeks       DATE: 3/6/25 @ 28wks:  SEX: Female  EFW: 1153 g  ANATOMY: completed  Position: breech  OTHER: repeat growth at 32 weeks   HISTORY   MEDICAL HISTORY:    Pre-pregnancy BMI = 33  anemia SURGICAL HISTORY:    Breast biopsy (Left)  Carpal Tunnel Release  Breast reduction       OBSTETRIC HISTORY   Month/Year Mode of Delivery EGA Wt. M/F Epidural Complications / Comments   2018  Term 6#5 Female none IOL   2016  Term 6#5 Male none IOL   2010  Term 6#13 Female Epidural    2005  Term 6#5 Male Epidural               SOCIAL HISTORY:    Smoker: non-smoker  Alcohol: denies  Drugs: denies  Relationship:   Domestic Violence: no  Lives with:   Education Level: Undergraduate Degree  Occupation: homemaker  Uatsdin: n/a GYN HISTORY:    PAP'S: no h/o abnormals  LAST PAP: NILM, negative HRHPV (8.29.24)  STD Hx: no past history  GENITAL HSV: no FAMILY HISTORY:    HTN: yes (mother and father)  DIABETES: yes (mother)  BLEEDING D/O: no  CLOTTING D/O: no  BIRTH DEFECTS: no  MENTAL DISABILITY: no  GYN CANCER: yes  Breast CA: M aunt, P aunt     No follow-ups on file.   Future Appointments   Date Time Provider Department Center   2025  2:00 PM ULTRASOUND, [facility]   2025  3:00 PM Val Alvarez MD [facility]   2025  3:40 PM Richie Sheldon MD [facility]   2025   4:00 PM Manjula Wall MD [facility]

## 2025-03-20 ENCOUNTER — PATIENT MESSAGE (OUTPATIENT)
Dept: OTHER | Facility: OTHER | Age: 40
End: 2025-03-20
Payer: MEDICAID

## 2025-03-25 ENCOUNTER — TELEPHONE (OUTPATIENT)
Dept: OBSTETRICS AND GYNECOLOGY | Facility: CLINIC | Age: 40
End: 2025-03-25
Payer: MEDICAID

## 2025-03-25 NOTE — TELEPHONE ENCOUNTER
Contacted pt to reschedule appt with Dr. Alvarez on 4/4/25. New appt set for 4/4/25 with Dr. Wall. Pt verbalized understanding.

## 2025-04-03 ENCOUNTER — PATIENT MESSAGE (OUTPATIENT)
Dept: OTHER | Facility: OTHER | Age: 40
End: 2025-04-03
Payer: MEDICAID

## 2025-04-11 ENCOUNTER — TELEPHONE (OUTPATIENT)
Dept: OBSTETRICS AND GYNECOLOGY | Facility: CLINIC | Age: 40
End: 2025-04-11

## 2025-04-11 ENCOUNTER — HOSPITAL ENCOUNTER (OUTPATIENT)
Dept: OBSTETRICS AND GYNECOLOGY | Facility: CLINIC | Age: 40
Discharge: HOME | End: 2025-04-11
Attending: STUDENT IN AN ORGANIZED HEALTH CARE EDUCATION/TRAINING PROGRAM
Payer: MEDICAID

## 2025-04-11 ENCOUNTER — ROUTINE PRENATAL (OUTPATIENT)
Dept: OBSTETRICS AND GYNECOLOGY | Facility: CLINIC | Age: 40
End: 2025-04-11
Payer: MEDICAID

## 2025-04-11 VITALS
SYSTOLIC BLOOD PRESSURE: 112 MMHG | BODY MASS INDEX: 33.3 KG/M2 | DIASTOLIC BLOOD PRESSURE: 70 MMHG | WEIGHT: 176.25 LBS | HEART RATE: 65 BPM

## 2025-04-11 DIAGNOSIS — Z3A.33 33 WEEKS GESTATION OF PREGNANCY: ICD-10-CM

## 2025-04-11 DIAGNOSIS — O09.523 MULTIGRAVIDA OF ADVANCED MATERNAL AGE IN THIRD TRIMESTER: Primary | ICD-10-CM

## 2025-04-11 DIAGNOSIS — O09.523 MULTIGRAVIDA OF ADVANCED MATERNAL AGE IN THIRD TRIMESTER: ICD-10-CM

## 2025-04-11 PROCEDURE — 99212 OFFICE O/P EST SF 10 MIN: CPT | Mod: PBBFAC,TH,PO | Performed by: STUDENT IN AN ORGANIZED HEALTH CARE EDUCATION/TRAINING PROGRAM

## 2025-04-11 PROCEDURE — 99999 PR PBB SHADOW E&M-EST. PATIENT-LVL II: CPT | Mod: PBBFAC,,, | Performed by: STUDENT IN AN ORGANIZED HEALTH CARE EDUCATION/TRAINING PROGRAM

## 2025-04-11 PROCEDURE — 76816 OB US FOLLOW-UP PER FETUS: CPT | Mod: 26,,, | Performed by: STUDENT IN AN ORGANIZED HEALTH CARE EDUCATION/TRAINING PROGRAM

## 2025-04-11 NOTE — TELEPHONE ENCOUNTER
----- Message from Ciera sent at 4/11/2025 12:57 PM CDT -----  Contact: Patient  Type:  Needs Medical AdviceWho Called: PatientWould the patient rather a call back or a response via [facility]? CallBest Call Back Number: [phone] Information: Patient states that she will be about 20 minutes late for appt. Please call to advise

## 2025-04-11 NOTE — PROGRESS NOTES
Faye Damaris Moon  39 y.o.   MRN  [id number] PATIENT   1985   FINAL EDC:   5   by 8 wk US    Baby: Brian    SO Name: Brian ALLERGIES:    NKDA      GBS: ___  Date: ___ OB PROBLEM LIST:    AMA, 40yo @ delivery    [x ] consider ASA 81mg    [x ] Targeted US    [  ] BPP weekly @ 32wks if >39    [x  ] Growth US @ 32wks      Obesity, BMI 33    [x ] baseline spot PCR and HbA1C      Anemia    Marginal cord insertion        TO-DO THROUGHOUT PREGNANCY:  Depression Screen: 1.925    Flu Shot: declined .  TDAP: declined 3.14.25  Infant feeding: breast   Plans for epidural: no  Classes at hospital: ___  PP contraception: declined  Delivery Consent: 25    Pediatrician: ___ MEDICATIONS:    PNV  Zofran  Fe  bASA  Miralax   TODAY'S PROGRESS NOTE    Patient is doing well today. She reports no complaints.  +FM    She report increase frequency of nose bleeds that can flow down her throat.  She denies any fan use.    She denies vaginal bleeding.  She denies leakage of fluid.  She denies contractions or abdominal pain.  She denies pelvic pressure.   She denies headaches, vision changes, right upper quadrant pain, non-dependent edema.    Vitals:    25 1350   BP: 112/70   Pulse: 65   Weight: 79.9 kg (176 lb 4.1 oz)       Growth 6#11oz    Cephalic     Assessment:   1. IUP at 33w1d    1. Multigravida of advanced maternal age in third trimester        Plan:  1. Keep follow up as scheduled  3. Discussed induction planning and expectations for delivery and postpartum  4. 3T precautions given  5. Discussed frequency of nosebleeds is due to change in cooler weather.  Encouraged humidifier use.                        LABS   INITIAL LABS:    DATE: 10/11/24  MBT: O positive  AB SCREEN: negative  TP-ABS: negative  RUBELLA: Immune  Hep B sAg: negative  Hep C Ab: negative  HIV: negative  H/H: 10.3 / 32.6  PLT: 281  VARICELLA: Immune  HGB: normal  URINE CX: negative    PAP: PAP neg / HPV neg /  Date: 24    EDITH/CHLAM/TRICH: negative x 2 / Date: 24   OTHER LABS:    10.11.24  A1c: 5.3  CMP: WNL  PCR: 0.05    DATE: 24  cfDNA ([id number]): XX, neg for T13, T18, T21   CARRIER SCREEN (Inheritest Core):  declined, had in prior pregnancy that was negative      Date 3.6.25  H/H: 10.2 / 31.3  PLT: 193  FERRITIN: 17  FTA-ABS: negative    1HR GDM SCREEN:   Lab Results   Component Value Date    OBGLUCOSESCR 106 2025          ULTRASOUNDS   ANATOMY SCAN:    DATE: 1/3/25 @ 19wks:  SEX: Female  EFW: 276 g  ANATOMY: wnl but incomplete  PLACENTA: posterior, marginal insertion  CERVIX: normal length  OTHER: recommend F/U to complete anatomy and repeat growth at 32 weeks       DATE: 3/6/25 @ 28wks:  SEX: Female  EFW: 1153 g  ANATOMY: completed  Position: breech  OTHER: repeat growth at 32 weeks   HISTORY   MEDICAL HISTORY:    Pre-pregnancy BMI = 33  anemia SURGICAL HISTORY:    Breast biopsy (Left)  Carpal Tunnel Release  Breast reduction       OBSTETRIC HISTORY   Month/Year Mode of Delivery EGA Wt. M/F Epidural Complications / Comments   2018  Term 6#5 Female none IOL   2016  Term 6#5 Male none IOL   2010  Term 6#13 Female Epidural    2005  Term 6#5 Male Epidural               SOCIAL HISTORY:    Smoker: non-smoker  Alcohol: denies  Drugs: denies  Relationship:   Domestic Violence: no  Lives with:   Education Level: Undergraduate Degree  Occupation: homemaker  Confucianism: n/a GYN HISTORY:    PAP'S: no h/o abnormals  LAST PAP: NILM, negative HRHPV (24)  STD Hx: no past history  GENITAL HSV: no FAMILY HISTORY:    HTN: yes (mother and father)  DIABETES: yes (mother)  BLEEDING D/O: no  CLOTTING D/O: no  BIRTH DEFECTS: no  MENTAL DISABILITY: no  GYN CANCER: yes  Breast CA: M aunt, P aunt     No follow-ups on file.   Future Appointments   Date Time Provider Department Center   2025  2:30 PM Manjula Wall MD [facility] [place] [facility]   2025  3:40 PM Richie Sheldon MD [facility]  [facility]   5/5/2025  4:00 PM Manjula Wall MD [facility]   5/19/2025  3:00 PM Val Alvarez MD [facility]

## 2025-04-24 ENCOUNTER — PATIENT MESSAGE (OUTPATIENT)
Dept: OTHER | Facility: OTHER | Age: 40
End: 2025-04-24
Payer: MEDICAID

## 2025-05-05 ENCOUNTER — ROUTINE PRENATAL (OUTPATIENT)
Dept: OBSTETRICS AND GYNECOLOGY | Facility: CLINIC | Age: 40
End: 2025-05-05
Payer: MEDICAID

## 2025-05-05 VITALS
DIASTOLIC BLOOD PRESSURE: 74 MMHG | SYSTOLIC BLOOD PRESSURE: 118 MMHG | WEIGHT: 179.38 LBS | HEART RATE: 72 BPM | BODY MASS INDEX: 33.89 KG/M2

## 2025-05-05 DIAGNOSIS — Z3A.36 36 WEEKS GESTATION OF PREGNANCY: Chronic | ICD-10-CM

## 2025-05-05 DIAGNOSIS — O09.523 MULTIGRAVIDA OF ADVANCED MATERNAL AGE IN THIRD TRIMESTER: Primary | ICD-10-CM

## 2025-05-05 PROCEDURE — 87653 STREP B DNA AMP PROBE: CPT | Performed by: STUDENT IN AN ORGANIZED HEALTH CARE EDUCATION/TRAINING PROGRAM

## 2025-05-05 PROCEDURE — 59426 ANTEPARTUM CARE ONLY: CPT | Mod: TH,S$PBB,, | Performed by: STUDENT IN AN ORGANIZED HEALTH CARE EDUCATION/TRAINING PROGRAM

## 2025-05-05 PROCEDURE — 99999 PR PBB SHADOW E&M-EST. PATIENT-LVL III: CPT | Mod: PBBFAC,,, | Performed by: STUDENT IN AN ORGANIZED HEALTH CARE EDUCATION/TRAINING PROGRAM

## 2025-05-05 PROCEDURE — 99213 OFFICE O/P EST LOW 20 MIN: CPT | Mod: PBBFAC,TH,PO | Performed by: STUDENT IN AN ORGANIZED HEALTH CARE EDUCATION/TRAINING PROGRAM

## 2025-05-05 NOTE — PROGRESS NOTES
Faye Damaris Moon  39 y.o.   MRN  [id number] PATIENT   1985   FINAL EDC:   5..25   by 8 wk US    Baby: Brian    SO Name: Brian ALLERGIES:    NKDA      GBS: ___  Date: ___ OB PROBLEM LIST:    AMA, 40yo @ delivery    [x ] consider ASA 81mg    [x ] Targeted US    [x ] Growth US @ 32wks      Obesity, BMI 33    [x ] baseline spot PCR and HbA1C      Anemia    Marginal cord insertion        TO-DO THROUGHOUT PREGNANCY:  Depression Screen: 1.925    Flu Shot: declined 12.  TDAP: declined 3  Infant feeding: breast   Plans for epidural: no  Classes at hospital: ___  PP contraception: declined  Delivery Consent: 25    Pediatrician: ___ MEDICATIONS:    PNV  Zofran  Fe  bASA  Miralax   TODAY'S PROGRESS NOTE    Patient is doing well today. She reports no complaints.  +FM    She report increase frequency of nose bleeds that can flow down her throat.  She denies any fan use.    She denies vaginal bleeding.  She denies leakage of fluid.  She denies contractions or abdominal pain.  She denies pelvic pressure.   She denies headaches, vision changes, right upper quadrant pain, non-dependent edema.    Vitals:    25 1623   BP: 118/74   Pulse: 72   Weight: 81.4 kg (179 lb 5.5 oz)         Growth 6#11oz    Cephalic     Assessment:   1. IUP at 33w1d    1. Multigravida of advanced maternal age in third trimester  -     Group B Streptococcus, PCR; Future; Expected date: 2025    2. 36 weeks gestation of pregnancy  -     Group B Streptococcus, PCR; Future; Expected date: 2025        Plan:  1. Keep follow up as scheduled  2. 3T precautions given  3. Pre-registration and birth certificate information given  4. GBS obtained                        LABS   INITIAL LABS:    DATE: 10/11/24  MBT: O positive  AB SCREEN: negative  TP-ABS: negative  RUBELLA: Immune  Hep B sAg: negative  Hep C Ab: negative  HIV: negative  H/H: 10.3 / 32.6  PLT: 281  VARICELLA: Immune  HGB: normal  URINE  CX: negative    PAP: PAP neg / HPV neg / Date: 24    EDITH/CHLAM/TRICH: negative x 2 / Date: 24   OTHER LABS:    10.11.24  A1c: 5.3  CMP: WNL  PCR: 0.05    DATE: 24  cfDNA ([id number]): XX, neg for T13, T18, T21   CARRIER SCREEN (Inheritest Core): declined, had in prior pregnancy that was negative     Date 3.6.25  H/H: 10.2 / 31.3  PLT: 193  FERRITIN: 17  FTA-ABS: negative    1HR GDM SCREEN:   Lab Results   Component Value Date    OBGLUCOSESCR 106 2025          ULTRASOUNDS   ANATOMY SCAN:    DATE: 1/3/25 @ 19wks:  SEX: Female  EFW: 276 g  ANATOMY: wnl but incomplete  PLACENTA: posterior, marginal insertion  CERVIX: normal length  OTHER: recommend F/U to complete anatomy and repeat growth at 32 weeks       DATE: 3/6/25 @ 28wks:  SEX: Female  EFW: 1153 g  ANATOMY: completed  Position: breech  OTHER: repeat growth at 32 weeks    DATE: 25 @ 32wks:  [place]apia live IUP   Fetal size is appropriate for gestational age, with the EFW (2116 g) plotting at the 26% and the AC plotting at the 52%.   A limited repeat fetal anatomic survey appears normal.   The MVP is normal.   Presentation is cephalic      HISTORY   MEDICAL HISTORY:    Pre-pregnancy BMI = 33  anemia SURGICAL HISTORY:    Breast biopsy (Left)  Carpal Tunnel Release  Breast reduction       OBSTETRIC HISTORY   Month/Year Mode of Delivery EGA Wt. M/F Epidural Complications / Comments   2018  Term 6#5 Female none IOL   2016  Term 6#5 Male none IOL   2010  Term 6#13 Female Epidural    2005  Term 6#5 Male Epidural               SOCIAL HISTORY:    Smoker: non-smoker  Alcohol: denies  Drugs: denies  Relationship:   Domestic Violence: no  Lives with:   Education Level: Undergraduate Degree  Occupation: homemaker  Denominational: n/a GYN HISTORY:    PAP'S: no h/o abnormals  LAST PAP: NILM, negative HRHPV (24)  STD Hx: no past history  GENITAL HSV: no FAMILY HISTORY:    HTN: yes (mother and father)  DIABETES: yes  (mother)  BLEEDING D/O: no  CLOTTING D/O: no  BIRTH DEFECTS: no  MENTAL DISABILITY: no  GYN CANCER: yes  Breast CA: M aunt, P aunt     No follow-ups on file.   Future Appointments   Date Time Provider Department Center   5/13/2025  3:00 PM Manjula Wall MD [facility]   5/19/2025  3:00 PM Val Alvarez MD [facility]

## 2025-05-07 ENCOUNTER — RESULTS FOLLOW-UP (OUTPATIENT)
Dept: OBSTETRICS AND GYNECOLOGY | Facility: CLINIC | Age: 40
End: 2025-05-07

## 2025-05-07 LAB — GROUP B STREPTOCOCCUS, PCR (OHS): POSITIVE

## 2025-05-13 ENCOUNTER — ROUTINE PRENATAL (OUTPATIENT)
Dept: OBSTETRICS AND GYNECOLOGY | Facility: CLINIC | Age: 40
End: 2025-05-13
Payer: MEDICAID

## 2025-05-13 VITALS
HEART RATE: 79 BPM | SYSTOLIC BLOOD PRESSURE: 134 MMHG | BODY MASS INDEX: 33.49 KG/M2 | WEIGHT: 177.25 LBS | DIASTOLIC BLOOD PRESSURE: 70 MMHG

## 2025-05-13 DIAGNOSIS — Z34.90 ENCOUNTER FOR ELECTIVE INDUCTION OF LABOR: ICD-10-CM

## 2025-05-13 DIAGNOSIS — O09.523 MULTIGRAVIDA OF ADVANCED MATERNAL AGE IN THIRD TRIMESTER: Primary | ICD-10-CM

## 2025-05-13 DIAGNOSIS — Z3A.37 37 WEEKS GESTATION OF PREGNANCY: ICD-10-CM

## 2025-05-13 PROCEDURE — 99999 PR PBB SHADOW E&M-EST. PATIENT-LVL III: CPT | Mod: PBBFAC,,, | Performed by: STUDENT IN AN ORGANIZED HEALTH CARE EDUCATION/TRAINING PROGRAM

## 2025-05-13 PROCEDURE — 59426 ANTEPARTUM CARE ONLY: CPT | Mod: TH,S$PBB,, | Performed by: STUDENT IN AN ORGANIZED HEALTH CARE EDUCATION/TRAINING PROGRAM

## 2025-05-13 PROCEDURE — 99213 OFFICE O/P EST LOW 20 MIN: CPT | Mod: PBBFAC,TH,PO | Performed by: STUDENT IN AN ORGANIZED HEALTH CARE EDUCATION/TRAINING PROGRAM

## 2025-05-13 NOTE — PROGRESS NOTES
Faye Mustafa  39 y.o.   MRN  [id number] PATIENT   1985   FINAL EDC:   25   by 8 wk US    Baby: Brian CORNELL Name: Brian ALLERGIES:    NKDA      GBS: Positive  Date: 25 OB PROBLEM LIST:    AMA, 40yo @ delivery    [x ] consider ASA 81mg    [x ] Targeted US    [x ] Growth US @ 32wks      Obesity, BMI 33    [x ] baseline spot PCR and HbA1C      Anemia    Marginal cord insertion        TO-DO THROUGHOUT PREGNANCY:  Depression Screen: 1.925    Flu Shot: declined 24  TDAP: declined 3.14.25  Infant feeding: breast   Plans for epidural: no  Classes at hospital: ___  PP contraception: declined  Delivery Consent: 25    Pediatrician: ___ MEDICATIONS:    PNV  Zofran  Fe  bASA  Miralax   TODAY'S PROGRESS NOTE    Patient is doing well today. She reports no complaints.  +FM    She report increase frequency of nose bleeds that can flow down her throat.  She denies any fan use.    She denies vaginal bleeding.  She denies leakage of fluid.  She denies contractions or abdominal pain.  She denies pelvic pressure.   She denies headaches, vision changes, right upper quadrant pain, non-dependent edema.    Vitals:    25 1520   BP: 134/70   Pulse: 79   Weight: 80.4 kg (177 lb 4 oz)     FH: 37  FHT: 145    Cvx /-4    5.5.25  Growth 6#11oz    Cephalic     Assessment:   1. IUP at 37w5d    1. Multigravida of advanced maternal age in third trimester    2. Encounter for elective induction of labor  -     IP OB Labor Induction; Future; Expected date: 2025    3. 37 weeks gestation of pregnancy        Plan:  1. RV in 1 week  2. Discussed induction of labor.  Patient desires.  Plan for elective induction on 24  3. 3T precautions given                        LABS   INITIAL LABS:    DATE: 10/11/24  MBT: O positive  AB SCREEN: negative  TP-ABS: negative  RUBELLA: Immune  Hep B sAg: negative  Hep C Ab: negative  HIV: negative  H/H: 10.3 / 32.6  PLT: 281  VARICELLA:  Immune  HGB: normal  URINE CX: negative    PAP: PAP neg / HPV neg / Date: 24    EDITH/CHLAM/TRICH: negative x 2 / Date: 24   OTHER LABS:    10.11.24  A1c: 5.3  CMP: WNL  PCR: 0.05    DATE: 24  cfDNA ([id number]): XX, neg for T13, T18, T21   CARRIER SCREEN (Inheritest Core): declined, had in prior pregnancy that was negative     Date 3.6.25  H/H: 10.2 / 31.3  PLT: 193  FERRITIN: 17  FTA-ABS: negative    1HR GDM SCREEN:   Lab Results   Component Value Date    OBGLUCOSESCR 106 2025          ULTRASOUNDS   ANATOMY SCAN:    DATE: 1/3/25 @ 19wks:  SEX: Female  EFW: 276 g  ANATOMY: wnl but incomplete  PLACENTA: posterior, marginal insertion  CERVIX: normal length  OTHER: recommend F/U to complete anatomy and repeat growth at 32 weeks       DATE: 3/6/25 @ 28wks:  SEX: Female  EFW: 1153 g  ANATOMY: completed  Position: breech  OTHER: repeat growth at 32 weeks    DATE: 25 @ 32wks:  [place]ia live IUP   Fetal size is appropriate for gestational age, with the EFW (2116 g) plotting at the 26% and the AC plotting at the 52%.   A limited repeat fetal anatomic survey appears normal.   The MVP is normal.   Presentation is cephalic      HISTORY   MEDICAL HISTORY:    Pre-pregnancy BMI = 33  anemia SURGICAL HISTORY:    Breast biopsy (Left)  Carpal Tunnel Release  Breast reduction       OBSTETRIC HISTORY   Month/Year Mode of Delivery EGA Wt. M/F Epidural Complications / Comments   2018  Term 6#5 Female none IOL   2016  Term 6#5 Male none IOL   2010  Term 6#13 Female Epidural    2005  Term 6#5 Male Epidural               SOCIAL HISTORY:    Smoker: non-smoker  Alcohol: denies  Drugs: denies  Relationship:   Domestic Violence: no  Lives with:   Education Level: Undergraduate Degree  Occupation: homemaker  Mosque: n/a GYN HISTORY:    PAP'S: no h/o abnormals  LAST PAP: NILM, negative HRHPV (24)  STD Hx: no past history  GENITAL HSV: no FAMILY HISTORY:    HTN: yes (mother and  father)  DIABETES: yes (mother)  BLEEDING D/O: no  CLOTTING D/O: no  BIRTH DEFECTS: no  MENTAL DISABILITY: no  GYN CANCER: yes  Breast CA: M aunt, P aunt     No follow-ups on file.   Future Appointments   Date Time Provider Department Center   5/21/2025 10:00 AM Manjula Wall MD [facility] [place] [facility]   5/22/2025 12:00 AM INDUCTION, [facility] [address]

## 2025-05-21 ENCOUNTER — ROUTINE PRENATAL (OUTPATIENT)
Dept: OBSTETRICS AND GYNECOLOGY | Facility: CLINIC | Age: 40
End: 2025-05-21
Payer: MEDICAID

## 2025-05-21 VITALS
BODY MASS INDEX: 34.1 KG/M2 | WEIGHT: 180.44 LBS | HEART RATE: 81 BPM | DIASTOLIC BLOOD PRESSURE: 76 MMHG | SYSTOLIC BLOOD PRESSURE: 120 MMHG

## 2025-05-21 DIAGNOSIS — O09.523 MULTIGRAVIDA OF ADVANCED MATERNAL AGE IN THIRD TRIMESTER: Primary | ICD-10-CM

## 2025-05-21 DIAGNOSIS — Z34.90 ENCOUNTER FOR ELECTIVE INDUCTION OF LABOR: ICD-10-CM

## 2025-05-21 PROCEDURE — 59426 ANTEPARTUM CARE ONLY: CPT | Mod: TH,S$PBB,, | Performed by: STUDENT IN AN ORGANIZED HEALTH CARE EDUCATION/TRAINING PROGRAM

## 2025-05-21 PROCEDURE — 99213 OFFICE O/P EST LOW 20 MIN: CPT | Mod: PBBFAC,TH,PO | Performed by: STUDENT IN AN ORGANIZED HEALTH CARE EDUCATION/TRAINING PROGRAM

## 2025-05-21 PROCEDURE — 99999 PR PBB SHADOW E&M-EST. PATIENT-LVL III: CPT | Mod: PBBFAC,,, | Performed by: STUDENT IN AN ORGANIZED HEALTH CARE EDUCATION/TRAINING PROGRAM

## 2025-05-21 RX ORDER — LIDOCAINE HYDROCHLORIDE 10 MG/ML
10 INJECTION, SOLUTION INFILTRATION; PERINEURAL ONCE AS NEEDED
Status: CANCELLED | OUTPATIENT
Start: 2025-05-22 | End: 2036-10-17

## 2025-05-21 RX ORDER — CALCIUM CARBONATE 200(500)MG
500 TABLET,CHEWABLE ORAL 3 TIMES DAILY PRN
Status: CANCELLED | OUTPATIENT
Start: 2025-05-22

## 2025-05-21 RX ORDER — MISOPROSTOL 100 MCG
25 TABLET ORAL ONCE
Status: CANCELLED | OUTPATIENT
Start: 2025-05-22

## 2025-05-21 RX ORDER — MISOPROSTOL 200 UG/1
800 TABLET ORAL ONCE AS NEEDED
Status: CANCELLED | OUTPATIENT
Start: 2025-05-22 | End: 2036-10-17

## 2025-05-21 RX ORDER — OXYTOCIN 10 [USP'U]/ML
10 INJECTION, SOLUTION INTRAMUSCULAR; INTRAVENOUS ONCE AS NEEDED
Status: CANCELLED | OUTPATIENT
Start: 2025-05-22 | End: 2036-10-17

## 2025-05-21 RX ORDER — MISOPROSTOL 200 UG/1
800 TABLET ORAL ONCE AS NEEDED
Status: CANCELLED | OUTPATIENT
Start: 2025-05-21

## 2025-05-21 RX ORDER — OXYTOCIN-SODIUM CHLORIDE 0.9% IV SOLN 30 UNIT/500ML 30-0.9/5 UT/ML-%
30 SOLUTION INTRAVENOUS ONCE AS NEEDED
Status: CANCELLED | OUTPATIENT
Start: 2025-05-22 | End: 2036-10-17

## 2025-05-21 RX ORDER — TERBUTALINE SULFATE 1 MG/ML
0.25 INJECTION SUBCUTANEOUS
Status: CANCELLED | OUTPATIENT
Start: 2025-05-22

## 2025-05-21 RX ORDER — MUPIROCIN 20 MG/G
OINTMENT TOPICAL
Status: CANCELLED | OUTPATIENT
Start: 2025-05-21

## 2025-05-21 RX ORDER — SODIUM CHLORIDE 9 MG/ML
INJECTION, SOLUTION INTRAVENOUS
Status: CANCELLED | OUTPATIENT
Start: 2025-05-22

## 2025-05-21 RX ORDER — SIMETHICONE 80 MG
1 TABLET,CHEWABLE ORAL 4 TIMES DAILY PRN
Status: CANCELLED | OUTPATIENT
Start: 2025-05-22

## 2025-05-21 RX ORDER — SODIUM CHLORIDE, SODIUM LACTATE, POTASSIUM CHLORIDE, CALCIUM CHLORIDE 600; 310; 30; 20 MG/100ML; MG/100ML; MG/100ML; MG/100ML
INJECTION, SOLUTION INTRAVENOUS CONTINUOUS
Status: CANCELLED | OUTPATIENT
Start: 2025-05-22

## 2025-05-21 RX ORDER — CARBOPROST TROMETHAMINE 250 UG/ML
250 INJECTION, SOLUTION INTRAMUSCULAR
Status: CANCELLED | OUTPATIENT
Start: 2025-05-22

## 2025-05-21 RX ORDER — ONDANSETRON 8 MG/1
8 TABLET, ORALLY DISINTEGRATING ORAL EVERY 8 HOURS PRN
Status: CANCELLED | OUTPATIENT
Start: 2025-05-22

## 2025-05-21 RX ORDER — METHYLERGONOVINE MALEATE 0.2 MG/ML
200 INJECTION INTRAVENOUS ONCE AS NEEDED
Status: CANCELLED | OUTPATIENT
Start: 2025-05-22 | End: 2036-10-17

## 2025-05-21 RX ORDER — DIPHENOXYLATE HYDROCHLORIDE AND ATROPINE SULFATE 2.5; .025 MG/1; MG/1
2 TABLET ORAL EVERY 6 HOURS PRN
Status: CANCELLED | OUTPATIENT
Start: 2025-05-22

## 2025-05-21 RX ORDER — BUTORPHANOL TARTRATE 2 MG/ML
2 INJECTION INTRAMUSCULAR; INTRAVENOUS EVERY 4 HOURS PRN
Status: CANCELLED | OUTPATIENT
Start: 2025-05-22

## 2025-05-21 RX ORDER — ONDANSETRON HYDROCHLORIDE 2 MG/ML
4 INJECTION, SOLUTION INTRAVENOUS EVERY 6 HOURS PRN
Status: CANCELLED | OUTPATIENT
Start: 2025-05-22

## 2025-05-21 RX ORDER — OXYTOCIN-SODIUM CHLORIDE 0.9% IV SOLN 30 UNIT/500ML 30-0.9/5 UT/ML-%
10 SOLUTION INTRAVENOUS ONCE AS NEEDED
Status: CANCELLED | OUTPATIENT
Start: 2025-05-22 | End: 2036-10-17

## 2025-05-22 ENCOUNTER — HOSPITAL ENCOUNTER (INPATIENT)
Facility: HOSPITAL | Age: 40
LOS: 1 days | Discharge: HOME | End: 2025-05-23
Attending: STUDENT IN AN ORGANIZED HEALTH CARE EDUCATION/TRAINING PROGRAM | Admitting: STUDENT IN AN ORGANIZED HEALTH CARE EDUCATION/TRAINING PROGRAM
Payer: MEDICAID

## 2025-05-22 DIAGNOSIS — Z34.90 ENCOUNTER FOR ELECTIVE INDUCTION OF LABOR: ICD-10-CM

## 2025-05-22 LAB
ABSOLUTE EOSINOPHIL (SMH): 0.07 K/UL
ABSOLUTE MONOCYTE (SMH): 0.56 K/UL (ref 0.3–1)
ABSOLUTE NEUTROPHIL COUNT (SMH): 6.4 K/UL (ref 1.8–7.7)
AMPHET UR QL SCN: NEGATIVE
BARBITURATE SCN PRESENT UR: NEGATIVE
BASOPHILS # BLD AUTO: 0.04 K/UL
BASOPHILS NFR BLD AUTO: 0.5 %
BENZODIAZ UR QL SCN: NEGATIVE
BUPRENORPHINE UR QL SCN: NEGATIVE
CANNABINOIDS UR QL SCN: NEGATIVE
COCAINE UR QL SCN: NEGATIVE
CREAT UR-MCNC: 264.5 MG/DL (ref 15–325)
ERYTHROCYTE [DISTWIDTH] IN BLOOD BY AUTOMATED COUNT: 15.1 % (ref 11.5–14.5)
FENTANYL UR QL SCN: NEGATIVE
GROUP & RH: NORMAL
HCT VFR BLD AUTO: 29 % (ref 37–48.5)
HGB BLD-MCNC: 9.5 GM/DL (ref 12–16)
IMM GRANULOCYTES # BLD AUTO: 0.04 K/UL (ref 0–0.04)
IMM GRANULOCYTES NFR BLD AUTO: 0.5 % (ref 0–0.5)
INDIRECT COOMBS: NORMAL
LYMPHOCYTES # BLD AUTO: 1.68 K/UL (ref 1–4.8)
MCH RBC QN AUTO: 25.3 PG (ref 27–31)
MCHC RBC AUTO-ENTMCNC: 32.8 G/DL (ref 32–36)
MCV RBC AUTO: 77 FL (ref 82–98)
NUCLEATED RBC (/100WBC) (SMH): 0 /100 WBC
OPIATES UR QL SCN: NEGATIVE
PCP UR QL: NEGATIVE
PLATELET # BLD AUTO: 150 K/UL (ref 150–450)
PMV BLD AUTO: 12.7 FL (ref 9.2–12.9)
RBC # BLD AUTO: 3.75 M/UL (ref 4–5.4)
RELATIVE EOSINOPHIL (SMH): 0.8 % (ref 0–8)
RELATIVE LYMPHOCYTE (SMH): 19.2 % (ref 18–48)
RELATIVE MONOCYTE (SMH): 6.4 % (ref 4–15)
RELATIVE NEUTROPHIL (SMH): 72.6 % (ref 38–73)
T PALLIDUM IGG+IGM SER QL: NORMAL
WBC # BLD AUTO: 8.77 K/UL (ref 3.9–12.7)

## 2025-05-22 PROCEDURE — 86593 SYPHILIS TEST NON-TREP QUANT: CPT | Performed by: STUDENT IN AN ORGANIZED HEALTH CARE EDUCATION/TRAINING PROGRAM

## 2025-05-22 PROCEDURE — 3E0DXGC INTRODUCTION OF OTHER THERAPEUTIC SUBSTANCE INTO MOUTH AND PHARYNX, EXTERNAL APPROACH: ICD-10-PCS | Performed by: STUDENT IN AN ORGANIZED HEALTH CARE EDUCATION/TRAINING PROGRAM

## 2025-05-22 PROCEDURE — 11000001 HC ACUTE MED/SURG PRIVATE ROOM

## 2025-05-22 PROCEDURE — 86901 BLOOD TYPING SEROLOGIC RH(D): CPT | Performed by: STUDENT IN AN ORGANIZED HEALTH CARE EDUCATION/TRAINING PROGRAM

## 2025-05-22 PROCEDURE — 59409 OBSTETRICAL CARE: CPT | Mod: GB,,, | Performed by: STUDENT IN AN ORGANIZED HEALTH CARE EDUCATION/TRAINING PROGRAM

## 2025-05-22 PROCEDURE — 85025 COMPLETE CBC W/AUTO DIFF WBC: CPT | Performed by: STUDENT IN AN ORGANIZED HEALTH CARE EDUCATION/TRAINING PROGRAM

## 2025-05-22 PROCEDURE — 25000003 PHARM REV CODE 250: Performed by: STUDENT IN AN ORGANIZED HEALTH CARE EDUCATION/TRAINING PROGRAM

## 2025-05-22 PROCEDURE — 72200005 HC VAGINAL DELIVERY LEVEL II

## 2025-05-22 PROCEDURE — 63600175 PHARM REV CODE 636 W HCPCS: Performed by: STUDENT IN AN ORGANIZED HEALTH CARE EDUCATION/TRAINING PROGRAM

## 2025-05-22 PROCEDURE — 80307 DRUG TEST PRSMV CHEM ANLYZR: CPT | Performed by: STUDENT IN AN ORGANIZED HEALTH CARE EDUCATION/TRAINING PROGRAM

## 2025-05-22 RX ORDER — OXYTOCIN 10 [USP'U]/ML
10 INJECTION, SOLUTION INTRAMUSCULAR; INTRAVENOUS ONCE AS NEEDED
Status: DISCONTINUED | OUTPATIENT
Start: 2025-05-22 | End: 2025-05-22

## 2025-05-22 RX ORDER — TRANEXAMIC ACID 10 MG/ML
1000 INJECTION, SOLUTION INTRAVENOUS EVERY 30 MIN PRN
Status: DISCONTINUED | OUTPATIENT
Start: 2025-05-22 | End: 2025-05-23 | Stop reason: HOSPADM

## 2025-05-22 RX ORDER — TRANEXAMIC ACID 10 MG/ML
1000 INJECTION, SOLUTION INTRAVENOUS EVERY 30 MIN PRN
Status: DISCONTINUED | OUTPATIENT
Start: 2025-05-22 | End: 2025-05-22

## 2025-05-22 RX ORDER — SODIUM CHLORIDE 0.9 % (FLUSH) 0.9 %
10 SYRINGE (ML) INJECTION
Status: DISCONTINUED | OUTPATIENT
Start: 2025-05-22 | End: 2025-05-23 | Stop reason: HOSPADM

## 2025-05-22 RX ORDER — DIPHENHYDRAMINE HCL 25 MG
25 CAPSULE ORAL EVERY 4 HOURS PRN
Status: DISCONTINUED | OUTPATIENT
Start: 2025-05-22 | End: 2025-05-23 | Stop reason: HOSPADM

## 2025-05-22 RX ORDER — MISOPROSTOL 200 UG/1
800 TABLET ORAL ONCE AS NEEDED
Status: DISCONTINUED | OUTPATIENT
Start: 2025-05-22 | End: 2025-05-23 | Stop reason: HOSPADM

## 2025-05-22 RX ORDER — SODIUM CHLORIDE 9 MG/ML
INJECTION, SOLUTION INTRAVENOUS
Status: DISCONTINUED | OUTPATIENT
Start: 2025-05-22 | End: 2025-05-22

## 2025-05-22 RX ORDER — HYDROCODONE BITARTRATE AND ACETAMINOPHEN 5; 325 MG/1; MG/1
1 TABLET ORAL EVERY 4 HOURS PRN
Status: DISCONTINUED | OUTPATIENT
Start: 2025-05-22 | End: 2025-05-23 | Stop reason: HOSPADM

## 2025-05-22 RX ORDER — OXYTOCIN/0.9 % SODIUM CHLORIDE 15/250 ML
95 PLASTIC BAG, INJECTION (ML) INTRAVENOUS ONCE AS NEEDED
Status: DISCONTINUED | OUTPATIENT
Start: 2025-05-22 | End: 2025-05-23 | Stop reason: HOSPADM

## 2025-05-22 RX ORDER — ONDANSETRON HYDROCHLORIDE 2 MG/ML
4 INJECTION, SOLUTION INTRAVENOUS EVERY 6 HOURS PRN
Status: DISCONTINUED | OUTPATIENT
Start: 2025-05-22 | End: 2025-05-22

## 2025-05-22 RX ORDER — CALCIUM CARBONATE 200(500)MG
500 TABLET,CHEWABLE ORAL 3 TIMES DAILY PRN
Status: DISCONTINUED | OUTPATIENT
Start: 2025-05-22 | End: 2025-05-22

## 2025-05-22 RX ORDER — PRENATAL WITH FERROUS FUM AND FOLIC ACID 3080; 920; 120; 400; 22; 1.84; 3; 20; 10; 1; 12; 200; 27; 25; 2 [IU]/1; [IU]/1; MG/1; [IU]/1; MG/1; MG/1; MG/1; MG/1; MG/1; MG/1; UG/1; MG/1; MG/1; MG/1; MG/1
1 TABLET ORAL DAILY
Status: DISCONTINUED | OUTPATIENT
Start: 2025-05-22 | End: 2025-05-23 | Stop reason: HOSPADM

## 2025-05-22 RX ORDER — SIMETHICONE 80 MG
1 TABLET,CHEWABLE ORAL EVERY 6 HOURS PRN
Status: DISCONTINUED | OUTPATIENT
Start: 2025-05-22 | End: 2025-05-23 | Stop reason: HOSPADM

## 2025-05-22 RX ORDER — ACETAMINOPHEN 325 MG/1
650 TABLET ORAL EVERY 6 HOURS SCHEDULED
Status: DISCONTINUED | OUTPATIENT
Start: 2025-05-22 | End: 2025-05-23 | Stop reason: HOSPADM

## 2025-05-22 RX ORDER — LIDOCAINE HYDROCHLORIDE 10 MG/ML
10 INJECTION, SOLUTION INFILTRATION; PERINEURAL ONCE AS NEEDED
Status: DISCONTINUED | OUTPATIENT
Start: 2025-05-22 | End: 2025-05-22

## 2025-05-22 RX ORDER — DIPHENOXYLATE HYDROCHLORIDE AND ATROPINE SULFATE 2.5; .025 MG/1; MG/1
2 TABLET ORAL EVERY 6 HOURS PRN
Status: DISCONTINUED | OUTPATIENT
Start: 2025-05-22 | End: 2025-05-22

## 2025-05-22 RX ORDER — ONDANSETRON 4 MG/1
8 TABLET, ORALLY DISINTEGRATING ORAL EVERY 8 HOURS PRN
Status: DISCONTINUED | OUTPATIENT
Start: 2025-05-22 | End: 2025-05-23 | Stop reason: HOSPADM

## 2025-05-22 RX ORDER — CARBOPROST TROMETHAMINE 250 UG/ML
250 INJECTION, SOLUTION INTRAMUSCULAR
Status: DISCONTINUED | OUTPATIENT
Start: 2025-05-22 | End: 2025-05-22

## 2025-05-22 RX ORDER — DOCUSATE SODIUM 100 MG/1
200 CAPSULE, LIQUID FILLED ORAL 2 TIMES DAILY PRN
Status: DISCONTINUED | OUTPATIENT
Start: 2025-05-22 | End: 2025-05-23 | Stop reason: HOSPADM

## 2025-05-22 RX ORDER — OXYTOCIN-SODIUM CHLORIDE 0.9% IV SOLN 30 UNIT/500ML 30-0.9/5 UT/ML-%
30 SOLUTION INTRAVENOUS ONCE AS NEEDED
Status: DISCONTINUED | OUTPATIENT
Start: 2025-05-22 | End: 2025-05-23 | Stop reason: HOSPADM

## 2025-05-22 RX ORDER — PROMETHAZINE HYDROCHLORIDE 25 MG/1
25 TABLET ORAL EVERY 6 HOURS PRN
Status: DISCONTINUED | OUTPATIENT
Start: 2025-05-22 | End: 2025-05-23 | Stop reason: HOSPADM

## 2025-05-22 RX ORDER — NAPROXEN 250 MG/1
500 TABLET ORAL 2 TIMES DAILY WITH MEALS
Status: DISCONTINUED | OUTPATIENT
Start: 2025-05-22 | End: 2025-05-23 | Stop reason: HOSPADM

## 2025-05-22 RX ORDER — MISOPROSTOL 100 MCG
25 TABLET ORAL ONCE
Status: COMPLETED | OUTPATIENT
Start: 2025-05-22 | End: 2025-05-22

## 2025-05-22 RX ORDER — SODIUM CHLORIDE, SODIUM LACTATE, POTASSIUM CHLORIDE, CALCIUM CHLORIDE 600; 310; 30; 20 MG/100ML; MG/100ML; MG/100ML; MG/100ML
INJECTION, SOLUTION INTRAVENOUS CONTINUOUS
Status: DISCONTINUED | OUTPATIENT
Start: 2025-05-22 | End: 2025-05-22

## 2025-05-22 RX ORDER — OXYTOCIN-SODIUM CHLORIDE 0.9% IV SOLN 30 UNIT/500ML 30-0.9/5 UT/ML-%
95 SOLUTION INTRAVENOUS CONTINUOUS PRN
Status: DISCONTINUED | OUTPATIENT
Start: 2025-05-22 | End: 2025-05-22

## 2025-05-22 RX ORDER — BUTORPHANOL TARTRATE 2 MG/ML
2 INJECTION INTRAMUSCULAR; INTRAVENOUS EVERY 4 HOURS PRN
Status: DISCONTINUED | OUTPATIENT
Start: 2025-05-22 | End: 2025-05-22

## 2025-05-22 RX ORDER — DIPHENHYDRAMINE HYDROCHLORIDE 50 MG/ML
25 INJECTION, SOLUTION INTRAMUSCULAR; INTRAVENOUS EVERY 4 HOURS PRN
Status: DISCONTINUED | OUTPATIENT
Start: 2025-05-22 | End: 2025-05-23 | Stop reason: HOSPADM

## 2025-05-22 RX ORDER — CARBOPROST TROMETHAMINE 250 UG/ML
250 INJECTION, SOLUTION INTRAMUSCULAR
Status: DISCONTINUED | OUTPATIENT
Start: 2025-05-22 | End: 2025-05-23 | Stop reason: HOSPADM

## 2025-05-22 RX ORDER — LANOLIN ALCOHOL/MO/W.PET/CERES
1 CREAM (GRAM) TOPICAL DAILY
Status: DISCONTINUED | OUTPATIENT
Start: 2025-05-22 | End: 2025-05-23 | Stop reason: HOSPADM

## 2025-05-22 RX ORDER — OXYTOCIN/0.9 % SODIUM CHLORIDE 15/250 ML
95 PLASTIC BAG, INJECTION (ML) INTRAVENOUS CONTINUOUS PRN
Status: DISCONTINUED | OUTPATIENT
Start: 2025-05-22 | End: 2025-05-23 | Stop reason: HOSPADM

## 2025-05-22 RX ORDER — METHYLERGONOVINE MALEATE 0.2 MG/ML
200 INJECTION INTRAVENOUS ONCE AS NEEDED
Status: DISCONTINUED | OUTPATIENT
Start: 2025-05-22 | End: 2025-05-23 | Stop reason: HOSPADM

## 2025-05-22 RX ORDER — HYDROCODONE BITARTRATE AND ACETAMINOPHEN 10; 325 MG/1; MG/1
1 TABLET ORAL EVERY 4 HOURS PRN
Status: DISCONTINUED | OUTPATIENT
Start: 2025-05-22 | End: 2025-05-23 | Stop reason: HOSPADM

## 2025-05-22 RX ORDER — OXYTOCIN-SODIUM CHLORIDE 0.9% IV SOLN 30 UNIT/500ML 30-0.9/5 UT/ML-%
30 SOLUTION INTRAVENOUS ONCE AS NEEDED
Status: DISCONTINUED | OUTPATIENT
Start: 2025-05-22 | End: 2025-05-22

## 2025-05-22 RX ORDER — HYDROCORTISONE 25 MG/G
CREAM TOPICAL 3 TIMES DAILY PRN
Status: DISCONTINUED | OUTPATIENT
Start: 2025-05-22 | End: 2025-05-23 | Stop reason: HOSPADM

## 2025-05-22 RX ORDER — MISOPROSTOL 200 UG/1
800 TABLET ORAL ONCE AS NEEDED
Status: DISCONTINUED | OUTPATIENT
Start: 2025-05-22 | End: 2025-05-22

## 2025-05-22 RX ORDER — OXYTOCIN-SODIUM CHLORIDE 0.9% IV SOLN 30 UNIT/500ML 30-0.9/5 UT/ML-%
10 SOLUTION INTRAVENOUS ONCE AS NEEDED
Status: DISCONTINUED | OUTPATIENT
Start: 2025-05-22 | End: 2025-05-22

## 2025-05-22 RX ORDER — SIMETHICONE 80 MG
1 TABLET,CHEWABLE ORAL 4 TIMES DAILY PRN
Status: DISCONTINUED | OUTPATIENT
Start: 2025-05-22 | End: 2025-05-22

## 2025-05-22 RX ORDER — OXYTOCIN 10 [USP'U]/ML
10 INJECTION, SOLUTION INTRAMUSCULAR; INTRAVENOUS ONCE AS NEEDED
Status: DISCONTINUED | OUTPATIENT
Start: 2025-05-22 | End: 2025-05-23 | Stop reason: HOSPADM

## 2025-05-22 RX ORDER — TERBUTALINE SULFATE 1 MG/ML
0.25 INJECTION SUBCUTANEOUS
Status: DISCONTINUED | OUTPATIENT
Start: 2025-05-22 | End: 2025-05-22

## 2025-05-22 RX ORDER — ONDANSETRON 4 MG/1
8 TABLET, ORALLY DISINTEGRATING ORAL EVERY 8 HOURS PRN
Status: DISCONTINUED | OUTPATIENT
Start: 2025-05-22 | End: 2025-05-22

## 2025-05-22 RX ORDER — DIPHENOXYLATE HYDROCHLORIDE AND ATROPINE SULFATE 2.5; .025 MG/1; MG/1
2 TABLET ORAL EVERY 6 HOURS PRN
Status: DISCONTINUED | OUTPATIENT
Start: 2025-05-22 | End: 2025-05-23 | Stop reason: HOSPADM

## 2025-05-22 RX ORDER — MUPIROCIN 20 MG/G
OINTMENT TOPICAL
Status: DISCONTINUED | OUTPATIENT
Start: 2025-05-22 | End: 2025-05-22

## 2025-05-22 RX ORDER — HYDROMORPHONE HYDROCHLORIDE 1 MG/ML
1 INJECTION, SOLUTION INTRAMUSCULAR; INTRAVENOUS; SUBCUTANEOUS EVERY 6 HOURS PRN
Status: DISCONTINUED | OUTPATIENT
Start: 2025-05-22 | End: 2025-05-23 | Stop reason: HOSPADM

## 2025-05-22 RX ORDER — METHYLERGONOVINE MALEATE 0.2 MG/ML
200 INJECTION INTRAVENOUS ONCE AS NEEDED
Status: DISCONTINUED | OUTPATIENT
Start: 2025-05-22 | End: 2025-05-22

## 2025-05-22 RX ORDER — OXYTOCIN-SODIUM CHLORIDE 0.9% IV SOLN 30 UNIT/500ML 30-0.9/5 UT/ML-%
95 SOLUTION INTRAVENOUS ONCE AS NEEDED
Status: DISCONTINUED | OUTPATIENT
Start: 2025-05-22 | End: 2025-05-22

## 2025-05-22 RX ADMIN — MISOPROSTOL 25 MCG: 100 TABLET ORAL at 03:05

## 2025-05-22 RX ADMIN — FERROUS SULFATE TAB 325 MG (65 MG ELEMENTAL FE) 1 EACH: 325 (65 FE) TAB at 04:05

## 2025-05-22 RX ADMIN — DEXTROSE MONOHYDRATE 5 MILLION UNITS: 5 INJECTION INTRAVENOUS at 03:05

## 2025-05-22 RX ADMIN — PENICILLIN G POTASSIUM 3 MILLION UNITS: 5000000 INJECTION, POWDER, FOR SOLUTION INTRAMUSCULAR; INTRAVENOUS at 07:05

## 2025-05-22 RX ADMIN — BUTORPHANOL TARTRATE 2 MG: 2 INJECTION, SOLUTION INTRAMUSCULAR; INTRAVENOUS at 10:05

## 2025-05-22 RX ADMIN — BENZOCAINE AND LEVOMENTHOL: 200; 5 SPRAY TOPICAL at 04:05

## 2025-05-22 RX ADMIN — BENZOCAINE AND LEVOMENTHOL: 200; 5 SPRAY TOPICAL at 09:05

## 2025-05-22 RX ADMIN — TERBUTALINE SULFATE 0.25 MG: 1 INJECTION, SOLUTION SUBCUTANEOUS at 11:05

## 2025-05-22 RX ADMIN — ACETAMINOPHEN 650 MG: 325 TABLET ORAL at 06:05

## 2025-05-22 RX ADMIN — SODIUM CHLORIDE, POTASSIUM CHLORIDE, SODIUM LACTATE AND CALCIUM CHLORIDE: 600; 310; 30; 20 INJECTION, SOLUTION INTRAVENOUS at 03:05

## 2025-05-22 NOTE — INTERVAL H&P NOTE
The patient has been examined and the H&P has been reviewed:    I concur with the findings and no changes have occurred since H&P was written.        Active Hospital Problems    Diagnosis  POA    *Encounter for elective induction of labor [Z34.90]  Not Applicable      Resolved Hospital Problems   No resolved problems to display.

## 2025-05-22 NOTE — H&P
Faye Mustafa  39 y.o.   MRN  [id number] PATIENT   1985   FINAL EDC:   25   by 8 wk US    Baby: Brian    SO Name: Brian ALLERGIES:    NKDA      GBS: Positive  Date: 25 OB PROBLEM LIST:    AMA, 40yo @ delivery    [x ] consider ASA 81mg    [x ] Targeted US    [x ] Growth US @ 32wks      Obesity, BMI 33    [x ] baseline spot PCR and HbA1C      Anemia    Marginal cord insertion        TO-DO THROUGHOUT PREGNANCY:  Depression Screen: 1.925    Flu Shot: declined 24  TDAP: declined 3.14.25  Infant feeding: breast   Plans for epidural: no  Classes at hospital: ___  PP contraception: declined  Delivery Consent: 25    Pediatrician: ___ MEDICATIONS:    PNV  Zofran  Fe  bASA  Miralax   Obstetric Admission  [facility]        HPI:     This patient is a 39 y.o.  female at 39w0d who is here for induction    Pregnancy complications: AMA, Grandmultip, Class I obesity, Anemia, Marginal cord insertion    REVIEW OF SYSTEMS  Constitutional: Negative for activity change, appetite change, chills, fatigue and fever.   HENT: Negative for congestion, facial swelling, nosebleeds and sore throat.    Eyes: Negative for photophobia and visual disturbance.   Respiratory: Negative for cough, chest tightness and shortness of breath.    Cardiovascular: Negative for chest pain, palpitations and leg swelling.   Gastrointestinal: Negative for abdominal pain, constipation, diarrhea, nausea and vomiting.   Genitourinary: Negative for difficulty urinating, dysuria, flank pain, frequency, genital sores, hematuria and urgency.   Musculoskeletal: Negative for back pain and neck stiffness.   Skin: Negative for color change and rash.   Neurological: Negative for syncope, weakness, light-headedness, numbness and headaches.   Hematological: Negative for adenopathy. Does not bruise/bleed easily.        MEDICAL HISTORY    Menstrual History:  OB History          5    Para   4    Term    4       0    AB   0    Living   4         SAB   0    IAB   0    Ectopic   0    Multiple   0    Live Births   4                Menarche age:   Patient's last menstrual period was 2024.        History reviewed. No pertinent past medical history.   Past Surgical History:   Procedure Laterality Date    BREAST BIOPSY Left     benign lymph node    CARPAL TUNNEL RELEASE      REDUCTION OF BOTH BREASTS            Social History[1]   Family History   Problem Relation Name Age of Onset    Heart disease Father      Diabetes Mother      Hypertension Mother      Breast cancer Paternal Aunt      Breast cancer Maternal Aunt        Review of patient's allergies indicates:  No Known Allergies   Prior to Admission medications    Medication Sig Start Date End Date Taking? Authorizing Provider   aspirin (ECOTRIN) 81 MG EC tablet Take 1 tablet (81 mg total) by mouth once daily.  Patient not taking: Reported on 2025  Manjula Wall MD   ferrous sulfate (FEOSOL) 325 mg (65 mg iron) Tab tablet Take 1 tablet (325 mg total) by mouth every 48 hours. (Every other day)  Patient not taking: Reported on 2025 3/7/25   Manjula Wall MD   ondansetron (ZOFRAN) 4 MG tablet Take 1 tablet (4 mg total) by mouth every 8 (eight) hours as needed for Nausea.  Patient not taking: Reported on 2025   Manjula Wall MD   polyethylene glycol (MIRALAX) 17 gram PwPk Take 17 g by mouth once daily.  Patient not taking: Reported on 2025   Manjula Wall MD   prenatal vit,calc76/iron/folic (PNV 29-1 ORAL) Take 1 tablet by mouth once daily.  Patient not taking: Reported on 2025    Provider, Historical            Vital Signs (Most Recent):  Pulse: 81 (25 1045)  BP: 120/76 (25 1045) Vital Signs (24h Range):  [unfilled]     HT:    WT: 81.8 kg (180 lb 7.1 oz)  BMI:       PHYSICAL EXAM    Constitutional:       General: She is not in acute distress.  HENT:      Head: Normocephalic and atraumatic.      Nose: Nose  normal.   Eyes:      Pupils: Pupils are equal, round, and reactive to light.   Cardiovascular:      Rate and Rhythm: Normal rate and regular rhythm.      Pulses: Normal pulses.      Heart sounds: Normal heart sounds.   Pulmonary:      Effort: Pulmonary effort is normal.   Abdominal:      General: Bowel sounds are normal.      Palpations: Abdomen is soft.   Genitourinary:     General: Normal vulva.      Vagina: Normal.      Cervix: 3/30/-3      Uterus: Gravid     Musculoskeletal:         General: Normal range of motion.      Cervical back: Normal range of motion and neck supple.   Skin:     General: Skin is warm and dry.   Neurological:      General: No focal deficit present.      Mental Status: She is alert.   Psychiatric:         Mood and Affect: Mood normal.    ASSESSMENT  IUP @ 39w0d here for induction    Problem List[2]      PLAN  Start cytotec  CMFM  VS per routine  AROM when necessary  GBS prophylaxis  ASVD       LABS   INITIAL LABS:    DATE: 10/11/24  MBT: O positive  AB SCREEN: negative  TP-ABS: negative  RUBELLA: Immune  Hep B sAg: negative  Hep C Ab: negative  HIV: negative  H/H: 10.3 / 32.6  PLT: 281  VARICELLA: Immune  HGB: normal  URINE CX: negative    PAP: PAP neg / HPV neg / Date: 8.29.24    EDITH/CHLAM/TRICH: negative x 2 / Date: 8.29.24   OTHER LABS:    10.11.24  A1c: 5.3  CMP: WNL  PCR: 0.05    DATE: 11/6/24  cfDNA ([id number]): XX, neg for T13, T18, T21   CARRIER SCREEN (Inheritest Core): declined, had in prior pregnancy that was negative     Date 3.6.25  H/H: 10.2 / 31.3  PLT: 193  FERRITIN: 17  FTA-ABS: negative    1HR GDM SCREEN:   Lab Results   Component Value Date    OBGLUCOSESCR 106 03/06/2025          ULTRASOUNDS   ANATOMY SCAN:    DATE: 1/3/25 @ 19wks:  SEX: Female  EFW: 276 g  ANATOMY: wnl but incomplete  PLACENTA: posterior, marginal insertion  CERVIX: normal length  OTHER: recommend F/U to complete anatomy and repeat growth at 32 weeks       DATE: 3/6/25 @ 28wks:  SEX: Female  EFW: 1153  g  ANATOMY: completed  Position: breech  OTHER: repeat growth at 32 weeks    DATE: 25 @ 32wks:  Tapia live IUP   Fetal size is appropriate for gestational age, with the EFW (2116 g) plotting at the 26% and the AC plotting at the 52%.   A limited repeat fetal anatomic survey appears normal.   The MVP is normal.   Presentation is cephalic      HISTORY   MEDICAL HISTORY:    Pre-pregnancy BMI = 33  anemia SURGICAL HISTORY:    Breast biopsy (Left)  Carpal Tunnel Release  Breast reduction       OBSTETRIC HISTORY   Month/Year Mode of Delivery EGA Wt. M/F Epidural Complications / Comments   2018  Term 6#5 Female none IOL   2016  Term 6#5 Male none IOL   2010  Term 6#13 Female Epidural    2005  Term 6#5 Male Epidural               SOCIAL HISTORY:    Smoker: non-smoker  Alcohol: denies  Drugs: denies  Relationship:   Domestic Violence: no  Lives with:   Education Level: Undergraduate Degree  Occupation: homemaker  Nondenominational: n/a GYN HISTORY:    PAP'S: no h/o abnormals  LAST PAP: NILM, negative HRHPV (8.29.24)  STD Hx: no past history  GENITAL HSV: no FAMILY HISTORY:    HTN: yes (mother and father)  DIABETES: yes (mother)  BLEEDING D/O: no  CLOTTING D/O: no  BIRTH DEFECTS: no  MENTAL DISABILITY: no  GYN CANCER: yes  Breast CA: M aunt, P aunt     No follow-ups on file.   Future Appointments   Date Time Provider Department Center   2025 12:00 AM INDUCTION, [facility] [address]                              [1]   Social History  Socioeconomic History    Marital status: Single   Tobacco Use    Smoking status: Never    Smokeless tobacco: Never   Substance and Sexual Activity    Alcohol use: Never    Drug use: Not Currently    Sexual activity: Yes     Partners: Male     Birth control/protection: None   [2]   Patient Active Problem List  Diagnosis    Multigravida of advanced maternal age in third trimester    Iron deficiency anemia

## 2025-05-22 NOTE — PLAN OF CARE
[facility]  Discharge Assessment    Primary Care Provider: No, Primary Doctor     OB Screen completed using health record.  No needs anticipated at this time, and no consult(s) noted.    OB Screen (most recent)       OB Screen - 25 1524          OB SCREEN    Assessment Type Discharge Planning Assessment     Source of Information health record     Received Prenatal Care Yes     Any indications/suspicions for None     Is this a teen pregnancy No     Is the baby in NICU No     Indication for adoption/Safe Haven No     Indication for DME/post-acute needs No     HIV (+) No     Any congenital  disorders No     Fetal demise/ death No

## 2025-05-22 NOTE — H&P (VIEW-ONLY)
Faye Mustafa  39 y.o.   MRN  [id number] PATIENT   1985   FINAL EDC:   25   by 8 wk US    Baby: Brian    SO Name: Brian ALLERGIES:    NKDA      GBS: Positive  Date: 25 OB PROBLEM LIST:    AMA, 40yo @ delivery    [x ] consider ASA 81mg    [x ] Targeted US    [x ] Growth US @ 32wks      Obesity, BMI 33    [x ] baseline spot PCR and HbA1C      Anemia    Marginal cord insertion        TO-DO THROUGHOUT PREGNANCY:  Depression Screen: 1.925    Flu Shot: declined 24  TDAP: declined 3.14.25  Infant feeding: breast   Plans for epidural: no  Classes at hospital: ___  PP contraception: declined  Delivery Consent: 25    Pediatrician: ___ MEDICATIONS:    PNV  Zofran  Fe  bASA  Miralax   Obstetric Admission  [facility]        HPI:     This patient is a 39 y.o.  female at 39w0d who is here for induction    Pregnancy complications: AMA, Grandmultip, Class I obesity, Anemia, Marginal cord insertion    REVIEW OF SYSTEMS  Constitutional: Negative for activity change, appetite change, chills, fatigue and fever.   HENT: Negative for congestion, facial swelling, nosebleeds and sore throat.    Eyes: Negative for photophobia and visual disturbance.   Respiratory: Negative for cough, chest tightness and shortness of breath.    Cardiovascular: Negative for chest pain, palpitations and leg swelling.   Gastrointestinal: Negative for abdominal pain, constipation, diarrhea, nausea and vomiting.   Genitourinary: Negative for difficulty urinating, dysuria, flank pain, frequency, genital sores, hematuria and urgency.   Musculoskeletal: Negative for back pain and neck stiffness.   Skin: Negative for color change and rash.   Neurological: Negative for syncope, weakness, light-headedness, numbness and headaches.   Hematological: Negative for adenopathy. Does not bruise/bleed easily.        MEDICAL HISTORY    Menstrual History:  OB History          5    Para   4    Term    4       0    AB   0    Living   4         SAB   0    IAB   0    Ectopic   0    Multiple   0    Live Births   4                Menarche age:   Patient's last menstrual period was 2024.        History reviewed. No pertinent past medical history.   Past Surgical History:   Procedure Laterality Date    BREAST BIOPSY Left     benign lymph node    CARPAL TUNNEL RELEASE      REDUCTION OF BOTH BREASTS            Social History[1]   Family History   Problem Relation Name Age of Onset    Heart disease Father      Diabetes Mother      Hypertension Mother      Breast cancer Paternal Aunt      Breast cancer Maternal Aunt        Review of patient's allergies indicates:  No Known Allergies   Prior to Admission medications    Medication Sig Start Date End Date Taking? Authorizing Provider   aspirin (ECOTRIN) 81 MG EC tablet Take 1 tablet (81 mg total) by mouth once daily.  Patient not taking: Reported on 2025  Manjula Wall MD   ferrous sulfate (FEOSOL) 325 mg (65 mg iron) Tab tablet Take 1 tablet (325 mg total) by mouth every 48 hours. (Every other day)  Patient not taking: Reported on 2025 3/7/25   Manjula Wall MD   ondansetron (ZOFRAN) 4 MG tablet Take 1 tablet (4 mg total) by mouth every 8 (eight) hours as needed for Nausea.  Patient not taking: Reported on 2025   Manjula Wall MD   polyethylene glycol (MIRALAX) 17 gram PwPk Take 17 g by mouth once daily.  Patient not taking: Reported on 2025   Manjula Wall MD   prenatal vit,calc76/iron/folic (PNV 29-1 ORAL) Take 1 tablet by mouth once daily.  Patient not taking: Reported on 2025    Provider, Historical            Vital Signs (Most Recent):  Pulse: 81 (25 1045)  BP: 120/76 (25 1045) Vital Signs (24h Range):  [unfilled]     HT:    WT: 81.8 kg (180 lb 7.1 oz)  BMI:       PHYSICAL EXAM    Constitutional:       General: She is not in acute distress.  HENT:      Head: Normocephalic and atraumatic.      Nose: Nose  normal.   Eyes:      Pupils: Pupils are equal, round, and reactive to light.   Cardiovascular:      Rate and Rhythm: Normal rate and regular rhythm.      Pulses: Normal pulses.      Heart sounds: Normal heart sounds.   Pulmonary:      Effort: Pulmonary effort is normal.   Abdominal:      General: Bowel sounds are normal.      Palpations: Abdomen is soft.   Genitourinary:     General: Normal vulva.      Vagina: Normal.      Cervix: 3/30/-3      Uterus: Gravid     Musculoskeletal:         General: Normal range of motion.      Cervical back: Normal range of motion and neck supple.   Skin:     General: Skin is warm and dry.   Neurological:      General: No focal deficit present.      Mental Status: She is alert.   Psychiatric:         Mood and Affect: Mood normal.    ASSESSMENT  IUP @ 39w0d here for induction    Problem List[2]      PLAN  Start cytotec  CMFM  VS per routine  AROM when necessary  GBS prophylaxis  ASVD       LABS   INITIAL LABS:    DATE: 10/11/24  MBT: O positive  AB SCREEN: negative  TP-ABS: negative  RUBELLA: Immune  Hep B sAg: negative  Hep C Ab: negative  HIV: negative  H/H: 10.3 / 32.6  PLT: 281  VARICELLA: Immune  HGB: normal  URINE CX: negative    PAP: PAP neg / HPV neg / Date: 8.29.24    EDITH/CHLAM/TRICH: negative x 2 / Date: 8.29.24   OTHER LABS:    10.11.24  A1c: 5.3  CMP: WNL  PCR: 0.05    DATE: 11/6/24  cfDNA ([id number]): XX, neg for T13, T18, T21   CARRIER SCREEN (Inheritest Core): declined, had in prior pregnancy that was negative     Date 3.6.25  H/H: 10.2 / 31.3  PLT: 193  FERRITIN: 17  FTA-ABS: negative    1HR GDM SCREEN:   Lab Results   Component Value Date    OBGLUCOSESCR 106 03/06/2025          ULTRASOUNDS   ANATOMY SCAN:    DATE: 1/3/25 @ 19wks:  SEX: Female  EFW: 276 g  ANATOMY: wnl but incomplete  PLACENTA: posterior, marginal insertion  CERVIX: normal length  OTHER: recommend F/U to complete anatomy and repeat growth at 32 weeks       DATE: 3/6/25 @ 28wks:  SEX: Female  EFW: 1153  g  ANATOMY: completed  Position: breech  OTHER: repeat growth at 32 weeks    DATE: 25 @ 32wks:  Tapia live IUP   Fetal size is appropriate for gestational age, with the EFW (2116 g) plotting at the 26% and the AC plotting at the 52%.   A limited repeat fetal anatomic survey appears normal.   The MVP is normal.   Presentation is cephalic      HISTORY   MEDICAL HISTORY:    Pre-pregnancy BMI = 33  anemia SURGICAL HISTORY:    Breast biopsy (Left)  Carpal Tunnel Release  Breast reduction       OBSTETRIC HISTORY   Month/Year Mode of Delivery EGA Wt. M/F Epidural Complications / Comments   2018  Term 6#5 Female none IOL   2016  Term 6#5 Male none IOL   2010  Term 6#13 Female Epidural    2005  Term 6#5 Male Epidural               SOCIAL HISTORY:    Smoker: non-smoker  Alcohol: denies  Drugs: denies  Relationship:   Domestic Violence: no  Lives with:   Education Level: Undergraduate Degree  Occupation: homemaker  Adventism: n/a GYN HISTORY:    PAP'S: no h/o abnormals  LAST PAP: NILM, negative HRHPV (8.29.24)  STD Hx: no past history  GENITAL HSV: no FAMILY HISTORY:    HTN: yes (mother and father)  DIABETES: yes (mother)  BLEEDING D/O: no  CLOTTING D/O: no  BIRTH DEFECTS: no  MENTAL DISABILITY: no  GYN CANCER: yes  Breast CA: M aunt, P aunt     No follow-ups on file.   Future Appointments   Date Time Provider Department Center   2025 12:00 AM INDUCTION, [facility] [address]                              [1]   Social History  Socioeconomic History    Marital status: Single   Tobacco Use    Smoking status: Never    Smokeless tobacco: Never   Substance and Sexual Activity    Alcohol use: Never    Drug use: Not Currently    Sexual activity: Yes     Partners: Male     Birth control/protection: None   [2]   Patient Active Problem List  Diagnosis    Multigravida of advanced maternal age in third trimester    Iron deficiency anemia

## 2025-05-22 NOTE — L&D DELIVERY NOTE
[facility]  Vaginal Delivery   Operative Note    SUMMARY     Normal spontaneous vaginal delivery of live infant, was placed on mothers abdomen for skin to skin and bulb suctioning performed.  Infant delivered position YARED over intact perineum.  Nuchal cord: No.    Spontaneous delivery of placenta and IV pitocin given noting good uterine tone.  No lacerations noted.  Patient tolerated delivery well. Sponge needle and lap counted correctly x2.    Indications: Encounter for elective induction of labor  Pregnancy complicated by: Problem List[1]  Admitting GA: 39w0d    Delivery Information for Girl Faye Mustafa    Birth information:  YOB: [birth year]25   Time of birth: 1:24 PM   Sex: female   Head Delivery Date/Time: 2025  1:24 PM   Delivery type: Vaginal, Spontaneous   Gestational Age: 39w0d       Delivery Providers    Delivering clinician: Manjula Wall MD   Provider Role    Maye Tamez RN Registered Nurse    Anny James RN Registered Nurse    Debbie Naranjo RN Registered Nurse    Emerald Tipton, RN Registered Nurse    Tiffany Chakraborty ST Scrub Person              Measurements    Weight: 2705 g  Weight (lbs): 5 lb 15.4 oz  Length:          Apgars    Living status: Living  Apgar Component Scores:  1 min.:  5 min.:  10 min.:  15 min.:  20 min.:    Skin color:  1  1       Heart rate:  2  2       Reflex irritability:  2  2       Muscle tone:  2  2       Respiratory effort:  2  2       Total:  9  9       Apgars assigned by: EMERALD TIPTON RN         Operative Delivery    Forceps attempted?: No  Vacuum extractor attempted?: No         Shoulder Dystocia    Shoulder dystocia present?: No           Presentation    Presentation: Vertex  Position: Middle Occiput Anterior           Interventions/Resuscitation    Method: Bulb Suctioning, Tactile Stimulation       Cord    Vessels: 3 vessels  Complications: None  Delayed Cord Clamping?: Yes  Cord Blood Disposition: Sent with  Baby  Gases Sent?: No  Stem Cell Collection (by MD): No       Placenta    Placenta delivery date/time: 2025 13:28  Placenta removal: Spontaneous  Placenta appearance: Intact  Placenta disposition: Discarded           Labor Events:       labor: No     Labor Onset Date/Time:         Dilation Complete Date/Time: 2025 13:21     Start Pushing Date/Time: 2025 13:21       Start Pushing Date/Time: 2025 13:21     Rupture Date/Time:            Rupture type:          Fluid Amount:       Fluid Color:                steroids: None     Antibiotics given for GBS: Yes     Induction: misoprostol     Indications for induction:  Elective     Augmentation:       Indications for augmentation:       Labor complications: None     Additional complications:          Cervical ripening:                     Delivery:      Episiotomy: None     Indication for Episiotomy:       Perineal Lacerations: None Repaired:      Periurethral Laceration:   Repaired:     Labial Laceration:   Repaired:     Sulcus Laceration:   Repaired:     Vaginal Laceration:   Repaired:     Cervical Laceration:   Repaired:     Repair suture: None     Repair # of packets: 0     Last Value - EBL - Nursing (mL):       Sum - EBL - Nursing (mL): 0     Last Value - EBL - Anesthesia (mL):      Calculated QBL (mL): 100     Running total QBL (mL): 100     Vaginal Sweep Performed: No     Surgicount Correct: Yes       Other providers:       Anesthesia    Method: Epidural          Details (if applicable):  Trial of Labor      Categorization:      Priority:     Indications for :     Incision Type:       Additional  information:  Forceps:    Vacuum:    Breech:    Observed anomalies    Other (Comments):              [1]   Patient Active Problem List  Diagnosis    Multigravida of advanced maternal age in third trimester    Iron deficiency anemia     (spontaneous vaginal delivery)

## 2025-05-22 NOTE — PROGRESS NOTES
Faye Ocampo Moon  39 y.o.   MRN  [id number] PATIENT   1985   FINAL EDC:   25   by 8 wk US    Baby: Brian    SO Name: Brian ALLERGIES:    NKDA      GBS: Positive  Date: 25 OB PROBLEM LIST:    AMA, 40yo @ delivery    [x ] consider ASA 81mg    [x ] Targeted US    [x ] Growth US @ 32wks      Obesity, BMI 33    [x ] baseline spot PCR and HbA1C      Anemia    Marginal cord insertion        TO-DO THROUGHOUT PREGNANCY:  Depression Screen: 1.925    Flu Shot: declined 12.  TDAP: declined 3  Infant feeding: breast   Plans for epidural: no  Classes at hospital: ___  PP contraception: declined  Delivery Consent: 25    Pediatrician: ___ MEDICATIONS:    PNV  Zofran  Fe  bASA  Miralax   TODAY'S PROGRESS NOTE    Patient is doing well today. She reports no complaints.  +FM    She denies vaginal bleeding.  She denies leakage of fluid.  She denies contractions or abdominal pain.  She denies pelvic pressure.   She denies headaches, vision changes, right upper quadrant pain, non-dependent edema.    Vitals:    25 1045   BP: 120/76   Pulse: 81   Weight: 81.8 kg (180 lb 7.1 oz)     FH: 37  FHT: 135    Cvx 3/30/-3    5.5.25  Growth 6#11oz    Cephalic     Assessment:   1. IUP at 38w6d    1. Multigravida of advanced maternal age in third trimester    2. Encounter for elective induction of labor  -     Cancel: Vital Signs; Standing  -     Cancel: Vital signs- Early Labor(0-4 cm); Standing  -     Cancel: Vital Signs- Active Labor (4-10 cm); Standing  -     Cancel: Vital Signs Post Delivery; Standing  -     Cancel: Check Temperature, Membranes Intact; Standing  -     Cancel: Check Temperature, Membranes Ruptured; Standing  -     Cancel: Pulse Oximetry Continous while CONTINUOUS electronic fetal Monitor in use; Standing  -     Cancel: Cervical Exam; Standing  -     Cancel: Fetal / Uterine Monitoring - Continuous; Standing  -     Cancel: Fetal monitoring - scalp electrode;  Standing  -     Cancel: Insert peripheral IV; Standing  -     Cancel: Eldridge to Gravity; Standing  -     Cancel: Eldridge Catheter Care every 12 hours; Standing  -     Cancel: Nurse to discontinue eldridge when patient no longer meets criteria; Standing  -     Cancel: Post Eldridge Catheter Removal Protocol; Standing  -     Cancel: Perform Bladder scan; Standing  -     Cancel: Perform Intermittent Catheterization; Standing  -     Cancel: Perform Bladder Scan, post intermittent cath; Standing  -     Cancel: Place indwelling catheter; Standing  -     Cancel: Watch for excessive vaginal bleeding; Standing  -     Cancel: Chlorhexidine Bath; Standing  -     Cancel: Decrease Oxytocin; Standing  -     Cancel: Discontinue oxytocin; Standing  -     Cancel: Oxytocin Titration Resumption; Standing  -     Cancel: Amnioinfusion PRN recurrent variable decelerations; Standing  -     Cancel: Administer a 500 -1000 ml IV fluid bolus as needed for; Standing  -     Cancel: Assess fundal height/uterine firmness, lochia, perineum; Standing  -     Cancel: Notify Physician; Standing  -     Cancel: Notify OB care provider; Standing  -     Cancel: Notify physician of cervical change or lack of change; Standing  -     Cancel: Notify physician for excessive uterine activity; Standing  -     Cancel: Notify physician for Fetal Heart Tones consistent with Category II or Category III tracing; Standing  -     Cancel: Notify Pediatrician after delivery; Standing  -     Cancel: Notify Nursery / Level II Nursery; Standing  -     Cancel: Notify Nursery / Level II Nursery; Standing  -     Cancel: Abdominal prep for  Section; Standing  -     Cancel: Chlorhexidine (CHG) 2% Wipes; Standing  -     Cancel: Chlorohexidine Gluconate Bath; Standing  -     Cancel: Vital signs every 15 minutes; Standing  -     Cancel: Vital signs every 15 minutes; Standing  -     Cancel: miSOPROStoL tablet 800 mcg  -     Cancel: miSOPROStoL tablet 800 mcg  -     Cancel: CBC with  Auto Differential; Standing  -     Cancel: Type & Screen, Labor & Delivery; Standing  -     Cancel: Treponema Pallidium Antibodies IgG, IgM; Standing  -     Cancel: POCT Cord Blood Gas Umbilical Artery Cord Gas; Standing  -     Cancel: POCT Cord Blood Gas Umbilical Vein Cord Gas; Standing  -     Cancel: Inpatient consult to Anesthesiology; Standing  -     Cancel: Full code; Standing  -     Cancel: Admit to Inpatient; Standing  -     Cancel: Place sequential compression device; Standing  -     Cancel: Saline lock IV; Standing  -     Cancel: Perform baseline 20 minute non stress test - proceed with other orders if reactive criteria met.; Standing  -     Cancel: miSOPROStol split tablet 25 mcg  -     Cancel: Maternal vital signs, fetal and uterine assessments after each dose and hourly; Standing  -     Cancel: Electronic fetal monitoring; Standing  -     Cancel: May ambulate 30 minutes after insertion of Misoprostol (Cytotec); Standing  -     Cancel: Withhold Misoprostol (Cytotec) dosing:; Standing  -     Cancel: Oxytocin should be delayed until at least 4 hours after the last Misoprostol (Cytotec) dose administered; Standing    Other orders  -     Cancel: Change position, roll left or right; Standing  -     Cancel: lactated ringers bolus 1,000 mL  -     Cancel: lactated ringers bolus 500 mL  -     Cancel: lactated ringers infusion  -     Cancel: 0.9% NaCl infusion  -     Cancel: IP VTE LOW RISK PATIENT; Standing  -     Cancel: mupirocin 2 % ointment  -     Cancel: oxytocin 15 units/250 mL (60 milliunits/mL) in 0.9% NaCl IV bolus from bag  -     Cancel: oxytocin (PITOCIN) 15 Units in 0.9% NaCl 250 mL infusion  -     Cancel: ondansetron disintegrating tablet 8 mg  -     Cancel: calcium carbonate 200 mg calcium (500 mg) chewable tablet 500 mg  -     Cancel: simethicone chewable tablet 80 mg  -     Cancel: LIDOcaine HCL 10 mg/ml (1%) injection 10 mL  -     Cancel: oxytocin 15 units/250 mL (60 milliunits/mL) in 0.9% NaCl  IV bolus from bag  -     Cancel: oxytocin (PITOCIN) 15 Units in 0.9% NaCl 250 mL infusion  -     Cancel: oxytocin injection 10 Units  -     Cancel: methylergonovine injection 200 mcg  -     Cancel: carboprost injection 250 mcg  -     Cancel: tranexamic acid (CYKLOKAPRON) 1,000 mg in 0.9% NaCl 100 mL IVPB (MB+)  -     Cancel: diphenoxylate-atropine 2.5-0.025 mg per tablet 2 tablet  -     Cancel: penicillin G potassium 5 Million Units in D5W 100 mL IVPB (MB+)  -     Cancel: penicillin G potassium 3 Million Units in D5W 100 mL IVPB  -     Cancel: butorphanol injection 2 mg  -     Cancel: ondansetron injection 4 mg  -     Cancel: terbutaline injection 0.25 mg        Plan:  1. IOL discussed  2. Instructions given                        LABS   INITIAL LABS:    DATE: 10/11/24  MBT: O positive  AB SCREEN: negative  TP-ABS: negative  RUBELLA: Immune  Hep B sAg: negative  Hep C Ab: negative  HIV: negative  H/H: 10.3 / 32.6  PLT: 281  VARICELLA: Immune  HGB: normal  URINE CX: negative    PAP: PAP neg / HPV neg / Date: 8.29.24    EDITH/CHLAM/TRICH: negative x 2 / Date: 8.29.24   OTHER LABS:    10.11.24  A1c: 5.3  CMP: WNL  PCR: 0.05    DATE: 11/6/24  cfDNA ([id number]): XX, neg for T13, T18, T21   CARRIER SCREEN (Inheritest Core): declined, had in prior pregnancy that was negative     Date 3.6.25  H/H: 10.2 / 31.3  PLT: 193  FERRITIN: 17  FTA-ABS: negative    1HR GDM SCREEN:   Lab Results   Component Value Date    OBGLUCOSESCR 106 03/06/2025          ULTRASOUNDS   ANATOMY SCAN:    DATE: 1/3/25 @ 19wks:  SEX: Female  EFW: 276 g  ANATOMY: wnl but incomplete  PLACENTA: posterior, marginal insertion  CERVIX: normal length  OTHER: recommend F/U to complete anatomy and repeat growth at 32 weeks       DATE: 3/6/25 @ 28wks:  SEX: Female  EFW: 1153 g  ANATOMY: completed  Position: breech  OTHER: repeat growth at 32 weeks    DATE: 4/11/25 @ 32wks:  Tapia live IUP   Fetal size is appropriate for gestational age, with the EFW (2116 g)  plotting at the 26% and the AC plotting at the 52%.   A limited repeat fetal anatomic survey appears normal.   The MVP is normal.   Presentation is cephalic      HISTORY   MEDICAL HISTORY:    Pre-pregnancy BMI = 33  anemia SURGICAL HISTORY:    Breast biopsy (Left)  Carpal Tunnel Release  Breast reduction       OBSTETRIC HISTORY   Month/Year Mode of Delivery EGA Wt. M/F Epidural Complications / Comments   2018  Term 6#5 Female none IOL   2016  Term 6#5 Male none IOL   2010  Term 6#13 Female Epidural    2005  Term 6#5 Male Epidural               SOCIAL HISTORY:    Smoker: non-smoker  Alcohol: denies  Drugs: denies  Relationship:   Domestic Violence: no  Lives with:   Education Level: Undergraduate Degree  Occupation: homemaker  Scientologist: n/a GYN HISTORY:    PAP'S: no h/o abnormals  LAST PAP: NILM, negative HRHPV (8.29.24)  STD Hx: no past history  GENITAL HSV: no FAMILY HISTORY:    HTN: yes (mother and father)  DIABETES: yes (mother)  BLEEDING D/O: no  CLOTTING D/O: no  BIRTH DEFECTS: no  MENTAL DISABILITY: no  GYN CANCER: yes  Breast CA: M aunt, P aunt     No follow-ups on file.   No future appointments.

## 2025-05-22 NOTE — PLAN OF CARE
Patient progressing well. Vital signs stable. Voiding well. Ambulating independently. Fundus firm and at umbilicus. Bleeding light. Pain adequately controlled with oral pain mediations.     Problem: Adult Inpatient Plan of Care  Goal: Plan of Care Review  Outcome: Progressing  Goal: Patient-Specific Goal (Individualized)  Outcome: Progressing  Goal: Absence of Hospital-Acquired Illness or Injury  Outcome: Progressing  Goal: Optimal Comfort and Wellbeing  Outcome: Progressing  Goal: Readiness for Transition of Care  Outcome: Progressing     Problem:  Fall Injury Risk  Goal: Absence of Fall, Infant Drop and Related Injury  Outcome: Progressing     Problem: Infection  Goal: Absence of Infection Signs and Symptoms  Outcome: Progressing     Problem: Labor  Goal: Hemostasis  Outcome: Progressing  Goal: Stable Fetal Wellbeing  Outcome: Progressing  Goal: Effective Progression to Delivery  Outcome: Progressing  Goal: Absence of Infection Signs and Symptoms  Outcome: Progressing  Goal: Acceptable Pain Control  Outcome: Progressing  Goal: Normal Uterine Contraction Pattern  Outcome: Progressing

## 2025-05-22 NOTE — LACTATION NOTE
05/22/25 1426   Maternal Assessment   Breast Density Bilateral:;soft   Areola Bilateral:;elastic;surgical scarring  (anchor scar from bilateral breast reduction)   Nipples Bilateral:;everted   Maternal Infant Feeding   Maternal Emotional State assist needed   Infant Positioning clutch/football   Signs of Milk Transfer audible swallow;infant jaw motion present   Pain with Feeding no   Comfort Measures Before/During Feeding infant position adjusted;latch adjusted;maternal position adjusted   Latch Assistance yes     Patient breastfeeding on right side in football position. Baby latched deeply, nursing well with audible swallows. Mother denies pain during feeding. Reviewed basic breastfeeding instructions and encouraged to call me for any further breastfeeding assistance. Patient verbalizes understanding of all instructions with good recall.    Instructed on proper latch to facilitate effective breastfeeding.  Discussed recognizing hunger cues, appropriate positioning and wide mouth latch.  Discussed ways to determine an effective latch including:  areola included in latch, rhythmic/nutritive sucking and audible swallowing.  Also discussed soreness/tenderness associated with latch and prevention and treatment.  Pt states understanding and verbalized appropriate recall.

## 2025-05-22 NOTE — NURSING
[facility]  Department of Obstetrics and Gynecology  PATIENT NAME: Faye Mustafa  MRN: [id number]  TODAY'S DATE: 2025    CHIEF COMPLAINT: No chief complaint on file.      OB History    Para Term  AB Living   5 4 4 0 0 4   SAB IAB Ectopic Multiple Live Births   0 0 0 0 4      # Outcome Date GA Lbr Travis/2nd Weight Sex Type Anes PTL Lv   5 Current            4 Term 18   2.863 kg (6 lb 5 oz) F Vag-Spont None  BROOKS   3 Term 16   2.863 kg (6 lb 5 oz) M Vag-Spont None  BROOKS   2 Term 04/28/10   3.09 kg (6 lb 13 oz) F Vag-Spont EPI  BROOKS   1 Term 05   2.863 kg (6 lb 5 oz) M Vag-Spont EPI  BROOKS     No past medical history on file.  Past Surgical History:   Procedure Laterality Date    BREAST BIOPSY Left     benign lymph node    CARPAL TUNNEL RELEASE      REDUCTION OF BOTH BREASTS           Social History[1]    Prenatal Labs  Lab Results   Component Value Date    GROUPTRH O POS 2025    HGB 9.5 (L) 2025    HCT 29.0 (L) 2025     2025    RUBELLAIMMUN Reactive 10/11/2024    HEPBSAG Non-reactive 10/11/2024    FJC93LPOG Non-reactive 10/11/2024    OBGLUCOSESCR 106 2025       VITAL SIGNS - ABNORMAL VITALS INCLUDE TEMP >100.4,RR <12 or >26, SUSTAINED MATERNAL PULSE <60 or >120     VITAL SIGNS (Most Recent)  Temp: 98.5 °F (36.9 °C) (25)  Pulse: 69 (25)  Resp: 18 (25)  BP: 138/67 (25)    OUTPATIENT MEDICATIONS  Current Outpatient Medications   Medication Instructions    aspirin (ECOTRIN) 81 mg, Oral, Daily    ferrous sulfate (FEOSOL) 325 mg, Oral, Every 48 hours, (Every other day)    ondansetron (ZOFRAN) 4 mg, Oral, Every 8 hours PRN    polyethylene glycol (MIRALAX) 17 g, Oral, Daily    prenatal vit,calc76/iron/folic (PNV 29-1 ORAL) 1 tablet, Daily       Herminia Vicente, RN  [facility]  2025          [1]   Social History  Tobacco Use    Smoking status: Never    Smokeless tobacco:  Never   Vaping Use    Vaping status: Never Used   Substance Use Topics    Alcohol use: Never    Drug use: Not Currently

## 2025-05-23 ENCOUNTER — PATIENT MESSAGE (OUTPATIENT)
Dept: OBSTETRICS AND GYNECOLOGY | Facility: HOSPITAL | Age: 40
End: 2025-05-23

## 2025-05-23 VITALS
WEIGHT: 180 LBS | TEMPERATURE: 98 F | HEIGHT: 61 IN | OXYGEN SATURATION: 96 % | BODY MASS INDEX: 33.99 KG/M2 | RESPIRATION RATE: 18 BRPM | SYSTOLIC BLOOD PRESSURE: 98 MMHG | HEART RATE: 61 BPM | DIASTOLIC BLOOD PRESSURE: 65 MMHG

## 2025-05-23 LAB
ABSOLUTE EOSINOPHIL (SMH): 0.23 K/UL
ABSOLUTE MONOCYTE (SMH): 0.77 K/UL (ref 0.3–1)
ABSOLUTE NEUTROPHIL COUNT (SMH): 6.5 K/UL (ref 1.8–7.7)
BASOPHILS # BLD AUTO: 0.02 K/UL
BASOPHILS NFR BLD AUTO: 0.2 %
ERYTHROCYTE [DISTWIDTH] IN BLOOD BY AUTOMATED COUNT: 15.4 % (ref 11.5–14.5)
HCT VFR BLD AUTO: 26.2 % (ref 37–48.5)
HGB BLD-MCNC: 8.6 GM/DL (ref 12–16)
IMM GRANULOCYTES # BLD AUTO: 0.05 K/UL (ref 0–0.04)
IMM GRANULOCYTES NFR BLD AUTO: 0.5 % (ref 0–0.5)
LYMPHOCYTES # BLD AUTO: 2.33 K/UL (ref 1–4.8)
MCH RBC QN AUTO: 25.4 PG (ref 27–31)
MCHC RBC AUTO-ENTMCNC: 32.8 G/DL (ref 32–36)
MCV RBC AUTO: 78 FL (ref 82–98)
NUCLEATED RBC (/100WBC) (SMH): 0 /100 WBC
PLATELET # BLD AUTO: 141 K/UL (ref 150–450)
PMV BLD AUTO: 13 FL (ref 9.2–12.9)
RBC # BLD AUTO: 3.38 M/UL (ref 4–5.4)
RELATIVE EOSINOPHIL (SMH): 2.3 % (ref 0–8)
RELATIVE LYMPHOCYTE (SMH): 23.6 % (ref 18–48)
RELATIVE MONOCYTE (SMH): 7.8 % (ref 4–15)
RELATIVE NEUTROPHIL (SMH): 65.6 % (ref 38–73)
WBC # BLD AUTO: 9.87 K/UL (ref 3.9–12.7)

## 2025-05-23 PROCEDURE — 99233 SBSQ HOSP IP/OBS HIGH 50: CPT | Mod: ,,, | Performed by: OBSTETRICS & GYNECOLOGY

## 2025-05-23 PROCEDURE — 36415 COLL VENOUS BLD VENIPUNCTURE: CPT | Performed by: STUDENT IN AN ORGANIZED HEALTH CARE EDUCATION/TRAINING PROGRAM

## 2025-05-23 PROCEDURE — 25000003 PHARM REV CODE 250: Performed by: STUDENT IN AN ORGANIZED HEALTH CARE EDUCATION/TRAINING PROGRAM

## 2025-05-23 PROCEDURE — 85025 COMPLETE CBC W/AUTO DIFF WBC: CPT | Performed by: STUDENT IN AN ORGANIZED HEALTH CARE EDUCATION/TRAINING PROGRAM

## 2025-05-23 RX ORDER — OXYCODONE AND ACETAMINOPHEN 5; 325 MG/1; MG/1
1 TABLET ORAL EVERY 4 HOURS PRN
Qty: 12 TABLET | Refills: 0 | Status: SHIPPED | OUTPATIENT
Start: 2025-05-23

## 2025-05-23 RX ORDER — IBUPROFEN 600 MG/1
600 TABLET, FILM COATED ORAL EVERY 6 HOURS PRN
Qty: 40 TABLET | Refills: 1 | Status: SHIPPED | OUTPATIENT
Start: 2025-05-23

## 2025-05-23 RX ORDER — FERROUS SULFATE 325(65) MG
325 TABLET ORAL
Qty: 48 TABLET | Refills: 3 | Status: SHIPPED | OUTPATIENT
Start: 2025-05-24 | End: 2026-05-24

## 2025-05-23 RX ADMIN — PRENATAL VIT W/ FE FUMARATE-FA TAB 27-0.8 MG 1 TABLET: 27-0.8 TAB at 08:05

## 2025-05-23 RX ADMIN — ACETAMINOPHEN 650 MG: 325 TABLET ORAL at 12:05

## 2025-05-23 RX ADMIN — ACETAMINOPHEN 650 MG: 325 TABLET ORAL at 03:05

## 2025-05-23 RX ADMIN — FERROUS SULFATE TAB 325 MG (65 MG ELEMENTAL FE) 1 EACH: 325 (65 FE) TAB at 08:05

## 2025-05-23 NOTE — DISCHARGE SUMMARY
[facility]  Department of Obstetrics & Gynecology  Discharge Summary    Admit Date: 5/22/2025    Today's Date: 05/23/2025    Hospital: [facility]    Delivery Physician: Dr Wall    Procedures Performed:  Vaginal Delivery    Hospital Course:      The patient is status post a vaginal delivery on 05/22/2025 as above.    The patient currently reports no postpartum issues.  Good pain control on oral medications.    Ambulating without difficulty.   Tolerating regular diet.      Breast Feeding.  No reported breast issues.     No significant issues with postpartum depression or postpartum blues at this time.    Vital signs reviewed.  BP: 111/63    Labs:    Recent Results (from the past 2 weeks)   CBC with Differential    Collection Time: 05/23/25  6:37 AM   Result Value Ref Range    WBC 9.87 3.90 - 12.70 K/uL    Hgb 8.6 (L) 12.0 - 16.0 gm/dL    Hct 26.2 (L) 37.0 - 48.5 %    Platelet Count 141 (L) 150 - 450 K/uL   CBC with Differential    Collection Time: 05/22/25  2:26 AM   Result Value Ref Range    WBC 8.77 3.90 - 12.70 K/uL    Hgb 9.5 (L) 12.0 - 16.0 gm/dL    Hct 29.0 (L) 37.0 - 48.5 %    Platelet Count 150 150 - 450 K/uL        No pulmonary or cardiac issues noted.  Heart: Regular rate and rhythm  Lungs:  No abnormal pulmonary effort.  Clear to auscultation.    Abdomen is soft with firm fundus and appropriate tenderness.     Discharge Diet: Regular     Discharge Activity: Pelvic rest x 6 weeks, light activity x 2 weeks.    Discharge Medications:      Medication List        START taking these medications      ferrous sulfate 325 mg (65 mg iron) Tab tablet  Commonly known as: FEOSOL  Take 1 tablet (325 mg total) by mouth 4 (four) times a week.  Start taking on: May 24, 2025     ibuprofen 600 MG tablet  Commonly known as: ADVIL,MOTRIN  Take 1 tablet (600 mg total) by mouth every 6 (six) hours as needed for Pain.     oxyCODONE-acetaminophen 5-325 mg per tablet  Commonly known as:  PERCOCET  Take 1 tablet by mouth every 4 (four) hours as needed for Pain (Diagnosis: Acute postpartum pain management).            STOP taking these medications      aspirin 81 MG EC tablet  Commonly known as: ECOTRIN     ondansetron 4 MG tablet  Commonly known as: ZOFRAN     PNV 29-1 ORAL     polyethylene glycol 17 gram Pwpk  Commonly known as: MIRALAX               Where to Get Your Medications        These medications were sent to [id number] - [place] - [address]  [address], [place] [address]      Hours: 24-hours Phone: [phone]   ferrous sulfate 325 mg (65 mg iron) Tab tablet  ibuprofen 600 MG tablet       Information about where to get these medications is not yet available    Ask your nurse or doctor about these medications  oxyCODONE-acetaminophen 5-325 mg per tablet          Discharge Follow-Up: 6 Weeks or as needed.    Condition on Discharge:  stable    Discharge Diagnosis:   S/P:  Vaginal Delivery    Precautions and instructions discussed prior to discarge, call with any postoperative/postpartum issues.    The above was reviewed and discussed with the patient.    The patient's questions regarding discharge were answered and she is in agreement with the discharge plan.    Richie Sheldon MD  Department OBGYN [facility]

## 2025-05-23 NOTE — PLAN OF CARE
Patient appropriate for discharge. Vital signs stable. Ambulating independently. Voiding well. Fundus firm and at umbilicus. Pain adequately controlled with oral pain medication.     Problem: Adult Inpatient Plan of Care  Goal: Plan of Care Review  Outcome: Met  Goal: Patient-Specific Goal (Individualized)  Outcome: Met  Goal: Absence of Hospital-Acquired Illness or Injury  Outcome: Met  Goal: Optimal Comfort and Wellbeing  Outcome: Met  Goal: Readiness for Transition of Care  Outcome: Met     Problem:  Fall Injury Risk  Goal: Absence of Fall, Infant Drop and Related Injury  Outcome: Met     Problem: Infection  Goal: Absence of Infection Signs and Symptoms  Outcome: Met     Problem: Labor  Goal: Hemostasis  Outcome: Met  Goal: Stable Fetal Wellbeing  Outcome: Met  Goal: Effective Progression to Delivery  Outcome: Met  Goal: Absence of Infection Signs and Symptoms  Outcome: Met  Goal: Acceptable Pain Control  Outcome: Met  Goal: Normal Uterine Contraction Pattern  Outcome: Met     Problem: Breastfeeding  Goal: Effective Breastfeeding  Outcome: Met

## 2025-05-23 NOTE — PLAN OF CARE
05/23/25 1136   Final Note   Assessment Type Final Discharge Note   Anticipated Discharge Disposition Home   What phone number can be called within the next 1-3 days to see how you are doing after discharge? [phone]   Post-Acute Status   Discharge Delays None known at this time     Discharge orders and chart reviewed with no further post-acute discharge needs identified at this time.  At this time, patient is cleared for discharge from Case Management standpoint.

## 2025-05-23 NOTE — DISCHARGE INSTRUCTIONS
FOLLOW UP WITH YOUR DOCTOR IN 6 WEEKS OR SOONER FOR ANY PROBLEMS.    Pelvic rest for 6 weeks (no sex, tampons, douching, nothing in the vagina)   You can experience vaginal bleeding on and off for up to 6 weeks, it will gradually get lighter and the color will change from bright red to a brownish discharge towards the end.     Activity:   NO strenuous activities or exercising. Do not /lift anything over 15 pounds. Do not do heavy housework or cleaning.   NO driving for 3 days. You may take short car trips but do not drive.   You may shower and/or soak in a bathtub, both are acceptable. Use a mild soap, no heavy perfumes or fragrances to avoid irritation.   If constipation develops: You may take Colace (stool softener), Milk of Magnesia, Dulcolax or Miralax. All of these medications are sold over the counter.     Care of Episiotomy:   Local agents such as Tucks pads & Dermoplast spray. You may also use a Sitz bath: sitting in a tub of warm water for 15 minutes 2-3 times per day will help relieve the discomfort.     Pain Relief:   You may take Motrin for mild pain & uterine cramping.     Emotional Changes:   You may experience baby blues after delivery. You may feel let down, anxious and cry easily. This is normal. These feelings can begin 2-3 days after delivery and usually disappear in about a week or two. Prolonged sadness may indicate postpartum depression.       ***SEEK IMMEDIATE HELP FROM THE EMERGENCY ROOM IF YOU HAVE ANY CHEST PAIN OR DIFFICULTY BREATHING.    Call your doctor for any of the following:   Problems with any of your medications, inability to eat.   Foul smelling vaginal discharge.   Temperature above 100.4.   Heavy vaginal bleeding. All women bleed different after delivery and each delivery is different. Heavy bleeding consists of saturating a georges pad in a 1 hour time period. Passing clots are normal, if you pass a blood clot larger than the size of a golf ball call your doctor's office.    If you experience pain in your legs/calves, if one leg increases in size and becomes swollen or becomes hot to touch or discolored.   Crying or periods of sadness beyond 2 weeks.     If you are breast feeding:   Wash your breasts with mild soap and warm water.   You should wear a supportive bra.   You should continue to take a prenatal vitamin for 6 weeks or until breastfeeding is discontinued.   If nipples are sore, apply a few drops of breast milk after nursing and let air dry or you can use Lanolin cream.   If breasts are engorged, apply warmth and express milk.   [facility] lactation consultant is available at [phone] for your breastfeeding needs.    If you are not breastfeeding:   Wear a tight bra and do not stimulate your breasts. Avoid handling your breasts and do not express milk. You may apply ice packs or cabbage leaves to relieve discomfort from engorgement. If your breasts become warm to touch, reddened or lumps develop call your doctor.

## 2025-06-12 ENCOUNTER — PATIENT MESSAGE (OUTPATIENT)
Dept: OBSTETRICS AND GYNECOLOGY | Facility: CLINIC | Age: 40
End: 2025-06-12
Payer: MEDICAID

## 2025-06-22 ENCOUNTER — PATIENT MESSAGE (OUTPATIENT)
Dept: OBSTETRICS AND GYNECOLOGY | Facility: CLINIC | Age: 40
End: 2025-06-22
Payer: MEDICAID

## 2025-06-23 ENCOUNTER — TELEPHONE (OUTPATIENT)
Dept: OBSTETRICS AND GYNECOLOGY | Facility: CLINIC | Age: 40
End: 2025-06-23
Payer: MEDICAID

## 2025-06-23 NOTE — TELEPHONE ENCOUNTER
Contacted pt via phone and advised she will have to discuss the form with Dr Alvarez on 07/07 to have it completed. Pt voiced understanding.

## 2025-06-23 NOTE — TELEPHONE ENCOUNTER
Contacted pt and notified we received the form from the [place] dept of Employment security.  However, she has only been seen by Dr Wall who is no longer here. Advised she will have to wait to have it completed during her appt 07/07/25 with Dr Alvarez. Pt voiced understanding.

## 2025-07-07 ENCOUNTER — TELEPHONE (OUTPATIENT)
Dept: OBSTETRICS AND GYNECOLOGY | Facility: CLINIC | Age: 40
End: 2025-07-07
Payer: COMMERCIAL

## 2025-07-07 NOTE — TELEPHONE ENCOUNTER
Copied from CRM #[id number]. Topic: Appointments - Appointment Rescheduling  >> Jul 7, 2025  2:21 PM Filiberto wrote:  Type:  Sooner Appointment Request    Caller is requesting a sooner appointment.  Caller declined first available appointment listed below.  Caller will not accept being placed on the waitlist and is requesting a message be sent to doctor.    Name of Caller:  Patient  When is the first available appointment?  N/a  Symptoms:  r/s postpartum  Would the patient rather a call back or a response via [facility]? Call  Best Call Back Number:  [phone]   Additional Information:   called to r/s 3:30appt.

## 2025-07-15 NOTE — PROGRESS NOTES
7 weeks postpartum    OB PROBLEM LIST:  AMA, 40yo @ delivery   Obesity, BMI 33    Anemia   Marginal cord insertion  Iron deficient anemia    Delivering doctor: Dr. Duke DUMONTsP's:   Relationship:    PAP Hx: no h/o abnormals  LAST PAP: 2024: PAP neg / HPV neg   MMG (08/10/2024) = negative     OB History    Para Term  AB Living   5 5 5 0 0 5   SAB IAB Ectopic Multiple Live Births   0 0 0 0 5      # Outcome Date GA Lbr Travis/2nd Weight Sex Type Anes PTL Lv   5 Term 25 39w0d / 00:03 2.705 kg (5 lb 15.4 oz) F Vag-Spont EPI N BROOKS      Name: Luis Alfredo Mustafa      Apgar1: 9  Apgar5: 9   4 Term 18   2.863 kg (6 lb 5 oz) F Vag-Spont None  BROOKS   3 Term 16   2.863 kg (6 lb 5 oz) M Vag-Spont None  BROOKS   2 Term 04/28/10   3.09 kg (6 lb 13 oz) F Vag-Spont EPI  BROOKS   1 Term 05   2.863 kg (6 lb 5 oz) M Vag-Spont EPI  BROOKS       ASSESSMENT AND PLAN     2025  7 weeks postpartum, doing well.    Pelvic rest restrictions removed.  Heavy lifting restrictions removed.  Continue PNV as long as breastfeeding.  EPDS reviewed; no interventions indicated.  f/u results of CBC and ferritin  Cervical Cancer screening: next cervical CA screen (PAP+HPV) due AUG 2029  Encouraged self breast awareness; RTC for breast concerns  Return to clinic: for periodic GYN wellness exam, every 1-3 years per patient preference and comfort level      SUBJECTIVE     2025  Faye Mustafa is a 39 y.o. here for a postpartum visit.    2025  Patient reports:   Vaginal bleeding: stopped Infant feeding: breast   Problems with perineum: No Problems with breasts: No   Problems with abdominal incision:  N/A Needing any pain meds: No   Resumed sex: Yes, no problem   Problems with bowel function: No   Problems with bladder function:  No     Contraceptive Plan: declines at this time   HPV Vaccine: declines   EPDS Score: 0 out of 30  Answer for Question 10 = 0      No current outpatient  medications    OBJECTIVE     PHYSICAL EXAM  Vitals:    07/16/25 1115   BP: (!) 140/60     GEN = alert/oriented, nad  HEENT = sclera anicteric, EOM grossly normal  BREASTS = deferred   = deferred, no concerns    LABS AND RADS    Lab Results   Component Value Date    WBC 9.87 05/23/2025    HGB 8.6 (L) 05/23/2025    HCT 26.2 (L) 05/23/2025     (L) 05/23/2025    MCV 78 (L) 05/23/2025       Val Alvarez MD

## 2025-07-16 ENCOUNTER — POSTPARTUM VISIT (OUTPATIENT)
Dept: OBSTETRICS AND GYNECOLOGY | Facility: CLINIC | Age: 40
End: 2025-07-16
Payer: MEDICAID

## 2025-07-16 VITALS
DIASTOLIC BLOOD PRESSURE: 60 MMHG | WEIGHT: 166.13 LBS | BODY MASS INDEX: 31.37 KG/M2 | SYSTOLIC BLOOD PRESSURE: 140 MMHG | HEIGHT: 61 IN

## 2025-07-16 DIAGNOSIS — D50.8 OTHER IRON DEFICIENCY ANEMIA: ICD-10-CM

## 2025-07-16 PROBLEM — O09.523 MULTIGRAVIDA OF ADVANCED MATERNAL AGE IN THIRD TRIMESTER: Status: RESOLVED | Noted: 2024-11-06 | Resolved: 2025-07-16

## 2025-07-16 PROCEDURE — 99999 PR PBB SHADOW E&M-EST. PATIENT-LVL II: CPT | Mod: PBBFAC,,, | Performed by: GENERAL PRACTICE

## 2025-07-16 PROCEDURE — 99212 OFFICE O/P EST SF 10 MIN: CPT | Mod: PBBFAC,PO | Performed by: GENERAL PRACTICE

## 2025-08-26 ENCOUNTER — TELEPHONE (OUTPATIENT)
Dept: OBSTETRICS AND GYNECOLOGY | Facility: CLINIC | Age: 40
End: 2025-08-26
Payer: MEDICAID

## 2025-08-27 ENCOUNTER — ON-DEMAND VIRTUAL (OUTPATIENT)
Dept: URGENT CARE | Facility: CLINIC | Age: 40
End: 2025-08-27
Payer: MEDICAID

## 2025-08-27 DIAGNOSIS — J01.90 ACUTE RHINOSINUSITIS: Primary | ICD-10-CM

## 2025-08-27 RX ORDER — METHYLPREDNISOLONE 4 MG/1
TABLET ORAL
Qty: 21 EACH | Refills: 0 | Status: SHIPPED | OUTPATIENT
Start: 2025-08-27 | End: 2025-09-17

## 2025-08-27 RX ORDER — AZITHROMYCIN 250 MG/1
TABLET, FILM COATED ORAL
Qty: 6 TABLET | Refills: 0 | Status: SHIPPED | OUTPATIENT
Start: 2025-08-27 | End: 2025-09-01